# Patient Record
Sex: FEMALE | Race: BLACK OR AFRICAN AMERICAN | Employment: FULL TIME | URBAN - METROPOLITAN AREA
[De-identification: names, ages, dates, MRNs, and addresses within clinical notes are randomized per-mention and may not be internally consistent; named-entity substitution may affect disease eponyms.]

---

## 2018-11-08 ENCOUNTER — TRANSFERRED RECORDS (OUTPATIENT)
Dept: HEALTH INFORMATION MANAGEMENT | Facility: CLINIC | Age: 58
End: 2018-11-08

## 2018-11-16 ENCOUNTER — TRANSFERRED RECORDS (OUTPATIENT)
Dept: HEALTH INFORMATION MANAGEMENT | Facility: CLINIC | Age: 58
End: 2018-11-16

## 2018-12-19 ENCOUNTER — TELEPHONE (OUTPATIENT)
Dept: ONCOLOGY | Facility: CLINIC | Age: 58
End: 2018-12-19

## 2018-12-19 NOTE — TELEPHONE ENCOUNTER
ONCOLOGY INTAKE: Records Information      APPT INFORMATION:  Referring provider:  Dr Tawnya Whitfield  Referring provider s clinic:  [facility] [place]  Reason for visit/diagnosis:  hx endometrail cancer, vaginal bleeding,     Were the records received with the referral (via [employer])? Faxed from [facility] [place]    Has patient been seen for any external appt for this diagnosis (enter clinic/location)? no

## 2018-12-21 ENCOUNTER — TELEPHONE (OUTPATIENT)
Dept: CALL CENTER | Age: 58
End: 2018-12-21

## 2019-01-18 ENCOUNTER — MEDICAL CORRESPONDENCE (OUTPATIENT)
Dept: HEALTH INFORMATION MANAGEMENT | Facility: CLINIC | Age: 59
End: 2019-01-18

## 2019-01-23 DIAGNOSIS — C54.1 ENDOMETRIAL CANCER (H): Primary | ICD-10-CM

## 2019-01-24 ENCOUNTER — TELEPHONE (OUTPATIENT)
Dept: ONCOLOGY | Facility: CLINIC | Age: 59
End: 2019-01-24

## 2019-01-24 ENCOUNTER — PRE VISIT (OUTPATIENT)
Dept: RADIATION ONCOLOGY | Facility: CLINIC | Age: 59
End: 2019-01-24

## 2019-01-24 NOTE — TELEPHONE ENCOUNTER
Date of appointment: 1/29/19   Diagnosis/reason for appointment:Endometrail Cancer  Referring provider/facility: Dr. Fraga  Who called: Pool message    Recent Studies  Imaging: PET 12/29/18-[facility] [place]                  ULT Pelvic 11/16/18-[facility]                  CT Pelvis- 11/8/18-[facility]  Requested 1/24/19    Pathology: Margo Path 12/20/18-[facility]  Requested 1/24/19    Previous radiation (if known):No    Records requested from:  Records received from:    Additional information:MRI being scheduled by Dr. Fraga's office-to be done prior to Rad Onc Consult

## 2019-01-25 ENCOUNTER — APPOINTMENT (OUTPATIENT)
Dept: LAB | Facility: CLINIC | Age: 59
End: 2019-01-25
Attending: RADIOLOGY
Payer: COMMERCIAL

## 2019-01-25 ENCOUNTER — ANCILLARY PROCEDURE (OUTPATIENT)
Dept: MRI IMAGING | Facility: CLINIC | Age: 59
End: 2019-01-25
Payer: COMMERCIAL

## 2019-01-25 DIAGNOSIS — C54.1 ENDOMETRIAL CANCER (H): ICD-10-CM

## 2019-01-25 PROCEDURE — 00000346 ZZHCL STATISTIC REVIEW OUTSIDE SLIDES TC 88321: Performed by: RADIOLOGY

## 2019-01-25 RX ORDER — GADOBUTROL 604.72 MG/ML
15 INJECTION INTRAVENOUS ONCE
Status: COMPLETED | OUTPATIENT
Start: 2019-01-25 | End: 2019-01-25

## 2019-01-25 RX ADMIN — GADOBUTROL 13 ML: 604.72 INJECTION INTRAVENOUS at 12:05

## 2019-01-25 NOTE — TELEPHONE ENCOUNTER
Patient was contacted to schedule needed MRI. Patient was resistant as she would like to do this the same day as her Rad/Onc appointment. This was not advised as MRI's take some time to read and we need to assure this is final before her appointment.      will reach out to patient again and attempt to schedule MRI prior to visit.     Graciela Eller RN

## 2019-01-25 NOTE — DISCHARGE INSTRUCTIONS
MRI Contrast Discharge Instructions    The IV contrast you received today will pass out of your body in your  urine. This will happen in the next 24 hours. You will not feel this process.  Your urine will not change color.    Drink at least 4 extra glasses of water or juice today (unless your doctor  has restricted your fluids). This reduces the stress on your kidneys.  You may take your regular medicines.    If you are on dialysis: It is best to have dialysis today.    If you have a reaction: Most reactions happen right away. If you have  any new symptoms after leaving the hospital (such as hives or swelling),  call your hospital at the correct number below. Or call your family doctor.  If you have breathing distress or wheezing, call 911.    Special instructions: ***    I have read and understand the above information.    Signature:______________________________________ Date:___________    Staff:__________________________________________ Date:___________     Time:__________    [facility] Departments:    ___[place]: [phone]  ___[facility]: [phone]  ___[place]: [phone] ___[facility]: [phone]  ___[facility]: [phone]  ___[facility]: [phone]  ___[phone]  ___[facility]: [phone]  ___[phone]

## 2019-01-28 LAB — COPATH REPORT: NORMAL

## 2019-01-30 ENCOUNTER — ALLIED HEALTH/NURSE VISIT (OUTPATIENT)
Dept: RADIATION ONCOLOGY | Facility: CLINIC | Age: 59
End: 2019-01-30
Attending: RADIOLOGY
Payer: COMMERCIAL

## 2019-01-30 VITALS
WEIGHT: 293 LBS | SYSTOLIC BLOOD PRESSURE: 127 MMHG | BODY MASS INDEX: 54.5 KG/M2 | DIASTOLIC BLOOD PRESSURE: 56 MMHG | HEART RATE: 79 BPM

## 2019-01-30 DIAGNOSIS — R52 PAIN: Primary | ICD-10-CM

## 2019-01-30 DIAGNOSIS — C54.1 ENDOMETRIAL CANCER (H): ICD-10-CM

## 2019-01-30 PROCEDURE — G0463 HOSPITAL OUTPT CLINIC VISIT: HCPCS | Performed by: RADIOLOGY

## 2019-01-30 PROCEDURE — 77334 RADIATION TREATMENT AID(S): CPT | Performed by: RADIOLOGY

## 2019-01-30 RX ORDER — ALBUTEROL SULFATE 0.83 MG/ML
3 SOLUTION RESPIRATORY (INHALATION) PRN
COMMUNITY

## 2019-01-30 RX ORDER — ATORVASTATIN CALCIUM 40 MG/1
40 TABLET, FILM COATED ORAL AT BEDTIME
COMMUNITY
Start: 2019-01-11

## 2019-01-30 RX ORDER — INSULIN LISPRO 100 [IU]/ML
90 INJECTION, SUSPENSION SUBCUTANEOUS 2 TIMES DAILY
COMMUNITY
Start: 2018-03-02

## 2019-01-30 RX ORDER — ALBUTEROL SULFATE 90 UG/1
1-2 AEROSOL, METERED RESPIRATORY (INHALATION) PRN
COMMUNITY
Start: 2017-06-09

## 2019-01-30 SDOH — HEALTH STABILITY: MENTAL HEALTH: HOW OFTEN DO YOU HAVE A DRINK CONTAINING ALCOHOL?: NEVER

## 2019-01-30 ASSESSMENT — ENCOUNTER SYMPTOMS
CHILLS: 0
DYSURIA: 0
BLURRED VISION: 0
COUGH: 0
MYALGIAS: 0
HEARTBURN: 0
FEVER: 0
HEADACHES: 0
BACK PAIN: 0
DEPRESSION: 1
NAUSEA: 0
NECK PAIN: 0
TINGLING: 0
SENSORY CHANGE: 1
DIZZINESS: 0

## 2019-01-30 NOTE — PROGRESS NOTES
Radiation Therapy Patient Education    Person involved with teaching: Patient,  and Granddaughter    Patient educational needs for self management of treatment-related side effects assessment completed.  EPIC Patient Ed tab contains Patient Learning Assessment    Education Materials Given  Radiation Therapy and You and Radiation Therapy for GYN Patients    Educational Topics Discussed  Side effects expected, Pain management, Skin care, Nutrition and weight loss and When to call MD/RN    Response To Teaching  Verbalizes understanding    GYN Only  Vaginal Dilator-given and educated: not given as she is still bleeding. Will give during treatment.    Referrals sent: None    Chemotherapy?  No

## 2019-01-30 NOTE — PROGRESS NOTES
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 No    Onset of menarche: about age 13  LMP: No LMP recorded.  Onset of menopause: 2013  Abnormal vaginal bleeding/discharge: Yes soaking a pad in a day, dark color  Are you pregnant? No  Reproductive note:     Nurse face-to-face time: Level 4:  15 min face to face time    Review of Systems   Constitutional: Negative for chills and fever.   HENT: Negative for hearing loss.    Eyes: Negative for blurred vision.   Respiratory: Negative for cough.    Cardiovascular: Negative for chest pain.   Gastrointestinal: Negative for heartburn and nausea.   Genitourinary: Negative for dysuria and urgency.   Musculoskeletal: Negative for back pain, myalgias and neck pain.   Neurological: Positive for sensory change (neuropathy hands and feet). Negative for dizziness, tingling and headaches.   Psychiatric/Behavioral: Positive for depression (for about 5 years).

## 2019-01-30 NOTE — PROGRESS NOTES
RADIATION ONCOLOGY CONSULTATION  DATE:  January 30, 2019    PATIENT NAME: Lilli Locke  MEDICAL RECORD NUMBER: [id number]  REFERRING PHYSICIAN:  Dr. Fraga    REASON FOR CONSULTATION: Consideration of salvage radiotherapy for treatment of isolated vaginal recurrence of endometrial carcinoma.      HISTORY OF PRESENT ILLNESS: Ms. Lilli Locke is a 58 year old woman with a history of endometrial carcinoma diagnosed in 2013. She underwent LELE/BSO, bilateral lymph node dissection and omentectomy by Dr. Fraga on 8/29/2013. Pathology showed FIGO Grade 1 endometrial endometrioid adenocarcinoma. The tumor size was 6.5 cm, with <50% myometrial invasion (0.5/3.2cm), no LVSI, negative peritoneal cytology, negative surgical margins, and negative dissected lymph nodes. The omentum and adnexa were negative for malignancy. Adjuvant therapy was not recommended due to overall low risk.  She was followed since then without issues. She was last seen by Dr. Fraga in 2016 with no evidence of recurrent disease.     In 11/2017, she saw Dr. Taveras for intermittent vaginal bleeding with pelvic pain and constipation. Her pelvic exam revealed intact vaginal cuff with no suspicious lesions. She underwent Pap smear which was negative for malignnacy. She presented to her PCP on 11/5/2018 with persistent symptoms as well as development of some blood in the stools. Her bimanual exam at that time revealed some firmness at the cuff and rectal exam was notable for a mass anterior to the rectum. She had a PAP smear that showed atypical glandular cells suspicious for malignancy. She underwent a pelvic CT which demonstrated no suspicious lesions in the vaginal cuff or pelvic lymphadenopathy.  She also had a pelvic ultrasound that was unremarkable.     She was then referred to Dr. Whitfield at [facility] on 12/17/18. Her pelvic exam showed irregular friable lesion at the vaginal cuff measuring 3 x 2 cm. A punch biopsy was obtained and showed  ([id number]) invasive adenocarcinoma,consistent with recurrent endometrial carcinoma.The pathology was reviewed confirmed at [facility] ([id number]). She was further evaluated with a PET CT on 18 which showed irregular soft tissue thickening of the vaginal cuff (SUV max 20.4), consistent with known recurrent endometrial cancer. There was no fat plane seen at the anterior wall of the rectum and posterior wall of the urinary bladder concerning for invasion. There was no evidence of lymphadenopathy or metastatic disease.     She then had an MRI of pelvis on 19 which demonstrated 2.1 x 3.6 x 1.8 cm enhancing soft tissue mass in the right vaginal cuff with posterior extension into the anterior wall of the rectum. No evidence of bladder invasion, pelvic lymphadenopathy or metastatic disease.    She is scheduled to see Dr. Fraga at [facility] on 19 and referred to us for consideration of radiotherapy.Today, she reports ongoing bleeding over one year that has progressed in the last 3 months. It was mostly brownish dark in color, and varies from spotting to more moderate bleeding requiring several pads a day. Typically she uses 1-2 pads per day. She reported associated constant belt- like pelvic pain, 8/10 in intensity for which she used to use a mixture of analgesics including Ibuprofen,Tylenol, advil and Aleve, although she was advised to stop taking all these medications with the exception of tylenol. She also reports having ongoing constipation for 3 months. She uses different stool softeners like Senna, Miralax and Magnesium Citrate liquid with poor relief. Otherwise, she denies fever, chills, weight or appetite changes. Her KPS is 90%     PMH:   Obstructive sleep apnea   Carpal tunnel syndrome   Diabetes mellitus  Hyperlipidemia   GERD    HTN    Morbid obesity    Substance abuse     PSH:    section  Tubal ligation      MEDICATIONS:  Gabapentin   Metformin   Sennosides   Atorvastatin     Rivaroxaban   Cetirizine    Omeprazole   Fluoxetine   Insulin   Fluticasone   Albuterol   Ketotifen fumarate     ALLERGIES:  Aspirin:Abdominal Pain   Milk:diarrhea.   Lisinopril:cough   Prochlorperazine:delusions     SH:   Smoking status: Never Smoker   Smokeless tobacco: Never Used   Current Alcohol use: No, abstinent since 3/2000     FAMILY HISTORY:   Mother: ovarian cancer  Grandmother: breast cancer    HISTORY OF PREVIOUS RADIATION THERAPY:  No    HISTORY OF SYTEMIC THERAPY:  No     IMPLANTABLE CARDIAC DEVICES:  No     ROS: 10 point ROS reviewed as reported on the nursing assessment note.    PHYSICAL EXAMINATION:    Gen: Alert, in NAD  Eyes: EOMI, sclera anicteric, PERRL  HENT      Head: NC/AT     Ears: No external auricular lesions     Nose/sinus: No rhinorrhea or epistaxis     Oral Cavity/Oropharynx: MMM, no thrush noted  Pulm: Breathing comfortably on room air, no audible wheezes or ronchi  CV: Well-perfused, no cyanosis  PELVIC: Deferred as the patient has ongoing bleeding.  Neurologic/MSK: Alert and oriented x3  Psych: Appropriate mood and affect    IMAGING:      IMPRESSION: Isolated vaginal recurrence of endometrial carcinoma,s/p LELE/BSO/BLND in 2013. FIGO Grade 1 endometrial endometrioid carcinoma, <50% myometrial invasion, no LVSI, negative peritoneal cytology, negative surgical margins, negative nodes. No previous radiation therapy.  No vandana or distant metastases. Her KPS is 90%.      RECOMMENDATION: We reviewed the images with the patient and her family and showed them the location and extent of the tumor. We discussed that the intent of the treatment is curative. This would involve  Radiotherapy delivered in two phases. The first phase will be external beam radiotherapy (EBRT) to the vagina, parametrium as well as the pelvic nodes to 4500 cGy in 25 fractions. This would be followed by vaginal cuff brachytherapy boost most likely via interstitial implant to 2250 cGy in 5 fractions. After completion  of the first part, she will undergo another pelvic MRI  for assessment of treatment response before proceeding with the second part. Review of the MRI shows a loop of bowel sitting directly above the vaginal cuff tumor.  She likely will require laparoscopic guidance for needle placement.      We discussed the benefits, rationale, logistics and side effects associated with RT including fatigue, diarrhea, blood with stool, nausea, myelosuppression, dysuria, urinary frequency, vaginal stenosis and dryness, sacral insufficiency fractures, bowel obstruction and fistula. We discussed that due to the close location of the tumor to the rectal wall, the expected late rectal toxicity, such as ulceration or bleeding would be higher. She is also at risk of small bowel obstruction. The patient agreed with the plan and signed the treatment consent. We did the simulation session shortly after the visit.      In regard to her chronic pain, we will prescribe Tylenol with Codeine. She is to stop NSAIDS due to ongoing bleeding.     We spent 45 min with the patient, >50% devoted to counseling. The patient, her  and colleague have many questions during our conversation that were answered to their satisfaction and verbalized understanding.     The patient was seen and discussed with staff, Dr. Elizabeth. Thank you for involving us in the care of this patient.  Please feel free to contact us with questions or concerns at any time.    Armando Betancur MD  PGY-2 Resident, Radiation Oncology  [facility]    I saw and examined the patient with the resident.  I have reviewed and edited the resident's note and agree with the plan of care.      Elsy Elizabeth MD

## 2019-01-30 NOTE — LETTER
1/30/2019     RE: Lilli Locke  [address] [place] [address]     Dear Colleague,    Thank you for referring your patient, Lilli Locke, to the RADIATION ONCOLOGY CLINIC. Please see a copy of my visit note below.    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 No    Onset of menarche: about age 13  LMP: No LMP recorded.  Onset of menopause: 2013  Abnormal vaginal bleeding/discharge: Yes soaking a pad in a day, dark color  Are you pregnant? No  Reproductive note:     Nurse face-to-face time: Level 4:  15 min face to face time    Review of Systems   Constitutional: Negative for chills and fever.   HENT: Negative for hearing loss.    Eyes: Negative for blurred vision.   Respiratory: Negative for cough.    Cardiovascular: Negative for chest pain.   Gastrointestinal: Negative for heartburn and nausea.   Genitourinary: Negative for dysuria and urgency.   Musculoskeletal: Negative for back pain, myalgias and neck pain.   Neurological: Positive for sensory change (neuropathy hands and feet). Negative for dizziness, tingling and headaches.   Psychiatric/Behavioral: Positive for depression (for about 5 years).                 RADIATION ONCOLOGY CONSULTATION  DATE:  January 30, 2019    PATIENT NAME: Lilli Locke  MEDICAL RECORD NUMBER: [id number]  REFERRING PHYSICIAN:  Dr. Fraga    REASON FOR CONSULTATION: Consideration of salvage radiotherapy for treatment of  isolated vaginal recurrence of endometrial carcinoma.      HISTORY OF PRESENT ILLNESS: Ms. Lilli Locke is a 58 year old woman with a history of endometrial carcinoma diagnosed in 2013. She underwent LELE/BSO, bilateral lymph node dissection and omentectomy by Dr. Fraga on 8/29/2013. Pathology showed FIGO Grade 1 endometrial endometrioid adenocarcinoma. The tumor size was 6.5 cm, with <50% myometrial invasion (0.5/3.2cm), no LVSI, negative peritoneal cytology, negative surgical margins, and negative dissected lymph nodes. The omentum and adnexa were negative for malignancy. Adjuvant therapy was not recommended due to overall low risk.  She was followed since then without issues. She was last seen by Dr. Fraga in 2016 with no evidence of recurrent disease.     In 11/2017, she saw Dr. Taveras for intermittent vaginal bleeding with pelvic pain and constipation. Her pelvic exam revealed intact vaginal cuff with no suspicious lesions. She underwent Pap smear which was negative for malignnacy. She presented to her PCP on 11/5/2018 with persistent symptoms as well as development of some blood in the stools. Her bimanual exam at that time revealed some firmness at the cuff and rectal exam was notable for a mass anterior to the rectum. She had a PAP smear that showed atypical glandular cells suspicious for malignancy. She underwent a pelvic CT which demonstrated no suspicious lesions in the vaginal cuff or pelvic lymphadenopathy.  She also had a pelvic ultrasound that was unremarkable.     She was then referred to Dr. Whitfield at [facility] [place] [facility] on 12/17/18. Her pelvic exam showed irregular friable lesion at the vaginal cuff measuring 3 x 2 cm. A punch biopsy was obtained and showed ([id number]) invasive adenocarcinoma,consistent with recurrent endometrial carcinoma.The pathology was reviewed confirmed at [facility] ([id number]). She was further evaluated with a PET CT on 12/29/18 which showed irregular soft tissue thickening of the  vaginal cuff (SUV max 20.4), consistent with known recurrent endometrial cancer. There was no fat plane seen at the anterior wall of the rectum and posterior wall of the urinary bladder concerning for invasion. There was no evidence of lymphadenopathy or metastatic disease.     She then had an MRI of pelvis on 19 which demonstrated 2.1 x 3.6 x 1.8 cm enhancing soft tissue mass in the right vaginal cuff with posterior extension into the anterior wall of the rectum. No evidence of bladder invasion, pelvic lymphadenopathy or metastatic disease.    She is scheduled to see Dr. Fraga at [facility] on 19 and referred to us for consideration of radiotherapy.Today, she reports ongoing bleeding over one year that has progressed in the last 3 months. It was mostly brownish dark in color, and varies from spotting to more moderate bleeding requiring several pads a day. Typically she uses 1-2 pads per day. She reported associated constant belt- like pelvic pain, 8/10 in intensity for which she used to use a mixture of analgesics including Ibuprofen,Tylenol, advil and Aleve, although she was advised to stop taking all these medications with the exception of tylenol. She also reports having ongoing constipation for 3 months. She uses different stool softeners like Senna, Miralax and Magnesium Citrate liquid with poor relief. Otherwise, she denies fever, chills, weight or appetite changes. Her KPS is 90%     PMH:   Obstructive sleep apnea   Carpal tunnel syndrome   Diabetes mellitus  Hyperlipidemia   GERD    HTN    Morbid obesity    Substance abuse     PSH:    section  Tubal ligation      MEDICATIONS:  Gabapentin   Metformin   Sennosides   Atorvastatin    Rivaroxaban   Cetirizine    Omeprazole   Fluoxetine   Insulin   Fluticasone   Albuterol   Ketotifen fumarate     ALLERGIES:  Aspirin:Abdominal Pain   Milk:diarrhea.   Lisinopril:cough   Prochlorperazine:delusions     SH:   Smoking status: Never Smoker    Smokeless tobacco: Never Used   Current Alcohol use: No, abstinent since 3/2000     FAMILY HISTORY:   Mother: ovarian cancer  Grandmother: breast cancer    HISTORY OF PREVIOUS RADIATION THERAPY:  No    HISTORY OF SYTEMIC THERAPY:  No     IMPLANTABLE CARDIAC DEVICES:  No     ROS: 10 point ROS reviewed as reported on the nursing assessment note.    PHYSICAL EXAMINATION:    Gen: Alert, in NAD  Eyes: EOMI, sclera anicteric, PERRL  HENT      Head: NC/AT     Ears: No external auricular lesions     Nose/sinus: No rhinorrhea or epistaxis     Oral Cavity/Oropharynx: MMM, no thrush noted  Pulm: Breathing comfortably on room air, no audible wheezes or ronchi  CV: Well-perfused, no cyanosis  PELVIC: Deferred as the patient has ongoing bleeding.  Neurologic/MSK: Alert and oriented x3  Psych: Appropriate mood and affect    IMAGING:      IMPRESSION: Isolated vaginal recurrence of endometrial carcinoma,s/p LELE/BSO/BLND in 2013. FIGO Grade 1 endometrial endometrioid carcinoma, <50% myometrial invasion, no LVSI, negative peritoneal cytology, negative surgical margins, negative nodes. No previous radiation therapy.  No vandana or distant metastases. Her KPS is 90%.    RECOMMENDATION: We reviewed the images with the patient and her family and showed them the location and extent of the tumor. We discussed that the intent of the treatment is curative. This would involve  Radiotherapy delivered in two phases. The first phase will be external beam radiotherapy (EBRT) to the vagina, parametrium as well as the pelvic nodes to 4500 cGy in 25 fractions. This would be followed by vaginal cuff brachytherapy boost most likely via interstitial implant to 2250 cGy in 5 fractions. After completion of the first part, she will undergo another pelvic MRI  for assessment of treatment response before proceeding with the second part. Review of the MRI shows a loop of bowel sitting directly above the vaginal cuff tumor.  She likely will require  laparoscopic guidance for needle placement.      We discussed the benefits, rationale, logistics and side effects associated with RT including fatigue, diarrhea, blood with stool, nausea, myelosuppression, dysuria, urinary frequency, vaginal stenosis and dryness, sacral insufficiency fractures, bowel obstruction and fistula. We discussed that due to the close location of the tumor to the rectal wall, the expected late rectal toxicity, such as ulceration or bleeding would be higher. She is also at risk of small bowel obstruction. The patient agreed with the plan and signed the treatment consent. We did the simulation session shortly after the visit.      In regard to her chronic pain, we will prescribe Tylenol with Codeine. She is to stop NSAIDS due to ongoing bleeding.     We spent 45 min with the patient, >50% devoted to counseling. The patient, her  and colleague have many questions during our conversation that were answered to their satisfaction and verbalized understanding.     The patient was seen and discussed with staff, Dr. Elizabeth. Thank you for involving us in the care of this patient.  Please feel free to contact us with questions or concerns at any time.    Armando Betancur MD  PGY-2 Resident, Radiation Oncology  [facility]    I saw and examined the patient with the resident.  I have reviewed and edited the resident's note and agree with the plan of care.      Elsy Elizabeth MD

## 2019-02-10 NOTE — TELEPHONE ENCOUNTER
RECORDS STATUS - ALL OTHER DIAGNOSIS      RECORDS RECEIVED FROM: [facility]   DATE RECEIVED: See pre-visit on 1/24/19   NOTES STATUS DETAILS   OFFICE NOTE from referring provider     OFFICE NOTE from medical oncologist     DISCHARGE SUMMARY from hospital     DISCHARGE REPORT from the ER     OPERATIVE REPORT     MEDICATION LIST     CLINICAL TRIAL TREATMENTS TO DATE     LABS     PATHOLOGY REPORTS     ANYTHING RELATED TO DIAGNOSIS     GENONOMIC TESTING     TYPE:     IMAGING (NEED IMAGES & REPORT)     CT SCANS     MRI     MAMMO     ULTRASOUND     PET

## 2019-02-11 ENCOUNTER — APPOINTMENT (OUTPATIENT)
Dept: RADIATION ONCOLOGY | Facility: CLINIC | Age: 59
End: 2019-02-11
Attending: RADIOLOGY
Payer: COMMERCIAL

## 2019-02-11 VITALS
DIASTOLIC BLOOD PRESSURE: 62 MMHG | SYSTOLIC BLOOD PRESSURE: 126 MMHG | HEART RATE: 82 BPM | WEIGHT: 293 LBS | BODY MASS INDEX: 54.47 KG/M2

## 2019-02-11 DIAGNOSIS — R52 PAIN: Primary | ICD-10-CM

## 2019-02-11 PROCEDURE — 77386 ZZH IMRT TREATMENT DELIVERY, COMPLEX: CPT | Performed by: RADIOLOGY

## 2019-02-11 RX ORDER — OXYCODONE AND ACETAMINOPHEN 5; 325 MG/1; MG/1
1 TABLET ORAL EVERY 6 HOURS PRN
Qty: 30 TABLET | Refills: 0 | Status: ON HOLD | OUTPATIENT
Start: 2019-02-11 | End: 2019-03-21

## 2019-02-11 NOTE — PROGRESS NOTES
[facility] PHYSICIANS  SPECIALIZING IN BREAKTHROUGHS  Radiation Oncology    On Treatment Visit Note    Lilli Locke      Date: 2019   MRN: [id number]   : 1960  Diagnosis: Endometrial Cancer    Reason for Visit:  On Radiation Treatment Visit     Treatment Summary to Date  Treatment Site: Pelvis Current Dose: 180/4500 +  cGy Fractions:        Chemotherapy  Chemo concurrent with radx?: No    Subjective: Ms. Locke presents today for her weekly on treatment visit. She tolerated her first treatment well. Unfortunately, she reports that her pelvic pain has not improved with T#3.  She describes it as constant, diffuse pelvic pain, 7-8/10 in intensity. She does endorse occasional minimal vaginal discharge, but no bleeding.  In regard to her chronic constipation, she states that her primary care physician asked her to take 1 packet of MiraLax every hour; however, she is only able to do 3 packets a day due to her work schedule.  She additionally take Senokot 1 tablet a day.  She did have a bowel movement yesterday, but that was after experiencing bad cramping the day before.  Overall, she remains constipated.      Nursing ROS:   Nutrition Alteration  Diet Type: Patient's Preference    Skin  Skin Reaction: 0 - No changes     Gastrointestinal  Nausea: 0 - None     Pain Assessment  0-10 Pain Scale: 7  Pain Note: Tylenol with codeine, not effective    Objective:   /62   Pulse 82   Wt 135.1 kg (297 lb 12.8 oz)   BMI 54.47 kg/m    Gen: Appears well, in no acute distress  Skin: No erythema    Labs:  CBC RESULTS: No results for input(s): WBC, RBC, HGB, HCT, MCV, MCH, MCHC, RDW, PLT in the last 15906 hours.  ELECTROLYTES:  No results for input(s): NA, POTASSIUM, CHLORIDE, NEFTALI, CO2, BUN, CR, GLC in the last 99295 hours.    Assessment:    Tolerating radiation therapy well.  All questions and concerns addressed.    Toxicities: None     Plan:   1. Continue current therapy.     2. Prescribed Percocet for better pain control.  We did discuss that Percocet can make her constipation worse.    3. MiraLax one packet with 8 oz of water each day.  She will increase the dose of Senokot to 2 tablets tonight.  If no relief, she will increase Senokot to 2 tabs twice daily.      Mosaiq chart and setup information reviewedmi  Ports checked    Medication Review  Med list reviewed with patient?: Yes    Educational Topic Discussed  Education Instructions: Reviewed    Armando Betancur MD  PGY-2 Resident, Radiation Oncology  [facility]      I saw and examined the patient with the resident.  I have reviewed and edited the resident's note and agree with the plan of care.      Elsy Elizabeth MD

## 2019-02-11 NOTE — LETTER
Date:February 13, 2019      Provider requested that no letter be sent. Do not send.       [facility] Health Information

## 2019-02-11 NOTE — LETTER
2019       RE: Lilli Locke  [address] [place] [address]     Dear Colleague,    Thank you for referring your patient, Lilli Locke, to the RADIATION ONCOLOGY CLINIC. Please see a copy of my visit note below.    [facility] PHYSICIANS  SPECIALIZING IN BREAKTHROUGHS  Radiation Oncology    On Treatment Visit Note    Lilli Locke      Date: 2019   MRN: [id number]   : 1960  Diagnosis: Endometrial Cancer    Reason for Visit:  On Radiation Treatment Visit     Treatment Summary to Date  Treatment Site: Pelvis Current Dose: 180/4500 +  cGy Fractions:        Chemotherapy  Chemo concurrent with radx?: No    Subjective: Ms. Locke presents today for her weekly on treatment visit. She tolerated her first treatment well. Unfortunately, she reports that her pelvic pain has not improved with T#3.  She describes it as constant, diffuse pelvic pain, 7-8/10 in intensity. She does endorse occasional minimal vaginal discharge, but no bleeding.  In regard to her chronic constipation, she states that her primary care physician asked her to take 1 packet of MiraLax every hour; however, she is only able to do 3 packets a day due to her work schedule.  She additionally take Senokot 1 tablet a day.  She did have a bowel movement yesterday, but that was after experiencing bad cramping the day before.  Overall, she remains constipated.      Nursing ROS:   Nutrition Alteration  Diet Type: Patient's Preference    Skin  Skin Reaction: 0 - No changes     Gastrointestinal  Nausea: 0 - None     Pain Assessment  0-10 Pain Scale: 7  Pain Note: Tylenol with codeine, not effective    Objective:   /62   Pulse 82   Wt 135.1 kg (297 lb 12.8 oz)   BMI 54.47 kg/m     Gen: Appears well, in no acute distress  Skin: No erythema    Labs:  CBC RESULTS: No results for input(s): WBC, RBC, HGB, HCT, MCV, MCH, MCHC, RDW, PLT in the last 99577 hours.  ELECTROLYTES:  No results for  input(s): NA, POTASSIUM, CHLORIDE, NEFTALI, CO2, BUN, CR, GLC in the last 62310 hours.    Assessment:    Tolerating radiation therapy well.  All questions and concerns addressed.    Toxicities: None     Plan:   1. Continue current therapy.    2. Prescribed Percocet for better pain control.  We did discuss that Percocet can make her constipation worse.    3. MiraLax one packet with 8 oz of water each day.  She will increase the dose of Senokot to 2 tablets tonight.  If no relief, she will increase Senokot to 2 tabs twice daily.      Mosaiq chart and setup information reviewedmi  Ports checked    Medication Review  Med list reviewed with patient?: Yes    Educational Topic Discussed  Education Instructions: Reviewed    Armando Betancur MD  PGY-2 Resident, Radiation Oncology  [facility]      I saw and examined the patient with the resident.  I have reviewed and edited the resident's note and agree with the plan of care.      Elsy Elizabeth MD    Again, thank you for allowing me to participate in the care of your patient.      Sincerely,    Elsy Elizabeth MD

## 2019-02-12 ENCOUNTER — ONCOLOGY VISIT (OUTPATIENT)
Dept: ONCOLOGY | Facility: CLINIC | Age: 59
End: 2019-02-12
Attending: OBSTETRICS & GYNECOLOGY
Payer: COMMERCIAL

## 2019-02-12 ENCOUNTER — PRE VISIT (OUTPATIENT)
Dept: ONCOLOGY | Facility: CLINIC | Age: 59
End: 2019-02-12

## 2019-02-12 ENCOUNTER — APPOINTMENT (OUTPATIENT)
Dept: RADIATION ONCOLOGY | Facility: CLINIC | Age: 59
End: 2019-02-12
Attending: RADIOLOGY
Payer: COMMERCIAL

## 2019-02-12 VITALS
SYSTOLIC BLOOD PRESSURE: 126 MMHG | DIASTOLIC BLOOD PRESSURE: 68 MMHG | WEIGHT: 293 LBS | TEMPERATURE: 97.2 F | OXYGEN SATURATION: 96 % | HEART RATE: 85 BPM | BODY MASS INDEX: 53.77 KG/M2

## 2019-02-12 DIAGNOSIS — C54.1 ENDOMETRIAL CANCER (H): Primary | ICD-10-CM

## 2019-02-12 PROCEDURE — 77386 ZZH IMRT TREATMENT DELIVERY, COMPLEX: CPT | Performed by: RADIOLOGY

## 2019-02-12 PROCEDURE — G0463 HOSPITAL OUTPT CLINIC VISIT: HCPCS

## 2019-02-12 PROCEDURE — 99215 OFFICE O/P EST HI 40 MIN: CPT | Mod: ZP | Performed by: OBSTETRICS & GYNECOLOGY

## 2019-02-12 ASSESSMENT — PAIN SCALES - GENERAL: PAINLEVEL: MODERATE PAIN (5)

## 2019-02-12 NOTE — NURSING NOTE
"Oncology Rooming Note    February 12, 2019 11:40 AM   Lilli FROST Cornelia is a 58 year old female who presents for:    Chief Complaint   Patient presents with     Oncology Clinic Visit     Endometrial CA; Active Tx, spotting     Initial Vitals: /68   Pulse 85   Temp 97.2  F (36.2  C) (Oral)   Wt 133.4 kg (294 lb)   SpO2 96%   BMI 53.77 kg/m   Estimated body mass index is 53.77 kg/m  as calculated from the following:    Height as of 10/26/15: 1.575 m (5' 2\").    Weight as of this encounter: 133.4 kg (294 lb). Body surface area is 2.42 meters squared.  Moderate Pain (5) Comment: Data Unavailable   No LMP recorded. Patient has had a hysterectomy.  Allergies reviewed: Yes  Medications reviewed: Yes    Medications: Medication refills not needed today.  Pharmacy name entered into [employer]: [id number] - [place], [place] - [address]              "

## 2019-02-12 NOTE — LETTER
2019     RE: Lilli Locke  [address] [place] [address]     Dear Colleague,    Thank you for referring your patient, Lilli Locke, to the [facility] CANCER [facility]IN[facility]. Please see a copy of my visit note below.                            Consult Notes on Referred Patient    Date: 2019       Dr. Tawnya Whitfield  [facility]  [address]  [place] [address]       RE: Lilli Locke  : 1960  DAVID: 2019    Dear Dr. Tawnya Whitfield:    I had the pleasure of seeing your patient Lilli Locke here at the Gynecologic Cancer Clinic at the [facility] on 2019.  As you know she is a very pleasant 58 year old woman with a recent diagnosis of centrally recurrent endometrial cancer.  Given these findings she was subsequently sent to the Gynecologic Cancer Clinic for new patient consultation.     Her history is as follows:    She is status post diagnostic laparoscopy converted to exploratory laparotomy, LELE, BSO, BPLND, omentectomy, cysto on 2013. Final pathology showed a FIGO Grade 1 endometrioid SUE, 6.5cm greatest dimension, <50% myometrial invasion (0.5/3.2cm), no LVI, negative cytology, negative margins, negative nodes. Omentum, adnexa negative.      Patient's postoperative course was complicated by readmission with PE (prior history of PE, discharged on home rivaroxaban) and nausea and vomiting.      The patient had limited follow-up. Was seen in Gyn Onc clinic at [facility] in 2014 and most recently, in 2016, with recs for six month follow-up.    The patient sought care in 2017 for vaginal bleeding and was seen by a local Ob/Gyn. Exam was negative. However, since that time, she reportedly has been bleeding regularly. She had a CT  which was negative, US  also negative. She had a pap smear by her PCP showing AGC worrisome for malignancy. She was then seen by another Ob/Gyn at [facility] on 18. Exam  notable for lesion at vaginal cuff which was biopsied. Path showed invasive SUE consistent with endometrial origin. PET on 18 showed soft tissue thickening at vaginal cuff concerning for recurrence with loss of fat plane at anterior wall rectum and posterior wall bladder. No metastatic disease.     2019:  Seen by me at [facility].  Exam notable for large mass at top of cuff.  Discussed referral to Rad Onc at [facility] for radiation therapy and MRI.    19:  MRI pelvis:  2.1 x 3.6 x 1.8 cm enhancing soft tissue mass in the right vaginal cuff with posterior extension into the anterior wall of the rectum. No evidence of bladder invasion, pelvic lymphadenopathy or metastatic disease.    19:  Seen by Rad Onc with plans for EBRT, vaginal cuff brachytherapy via interstitial implant    The patient presents today for evaluation. She started radiation yesterday.  Some diarrhea yesterday also which has now resolved.  Pain controlled with Percocet. Ongoing intermittent vaginal bleeding.  No other complaints.       Past Medical History:    Past Medical History:   Diagnosis Date     Endometrial cancer (H)      Hx of pulmonary embolus      Mixed hyperlipidemia      Obstructive sleep apnea     has CPAP     Type II diabetes mellitus (H)          Past Surgical History:    Past Surgical History:   Procedure Laterality Date      SECTION      x2     TUBAL LIGATION           Health Maintenance:  Health Maintenance Due   Topic Date Due     PHQ-2 Q1 YR  1972     HIV SCREEN (SYSTEM ASSIGNED)  1978     HEPATITIS C SCREENING  1978     PAP SCREENING Q3 YR (SYSTEM ASSIGNED)  1981     DTAP/TDAP/TD IMMUNIZATION (1 - Tdap) 1985     MAMMO SCREEN Q2 YR (SYSTEM ASSIGNED)  2000     LIPID SCREEN Q5 YR FEMALE (SYSTEM ASSIGNED)  2005     COLON CANCER SCREEN (SYSTEM ASSIGNED)  2010     ZOSTER IMMUNIZATION (1 of 2) 2010     ADVANCE DIRECTIVE PLANNING Q5 YRS  2015     INFLUENZA  VACCINE (1) 09/01/2018       Current Medications:     has a current medication list which includes the following prescription(s): albuterol, albuterol, atorvastatin, cetirizine, fluoxetine, fluticasone-salmeterol, gabapentin, insulin lispro prot & lispro, lisinopril, metformin hcl, omeprazole, and oxycodone-acetaminophen.       Allergies:     [unfilled]        Social History:     Social History     Tobacco Use     Smoking status: Never Smoker     Smokeless tobacco: Never Used   Substance Use Topics     Alcohol use: No     Alcohol/week: 0.0 oz     Frequency: Never       History   Drug Use Not on file           Family History:     The patient's family history is notable for:    Family History   Problem Relation Age of Onset     Cancer Mother         ovarian     Cancer Maternal Grandmother         Breast          Physical Exam:     /68   Pulse 85   Temp 97.2  F (36.2  C) (Oral)   Wt 133.4 kg (294 lb)   SpO2 96%   BMI 53.77 kg/m     Body mass index is 53.77 kg/m .    General Appearance: healthy and alert, no distress     Musculoskeletal: extremities non tender and without edema    Skin: no lesions or rashes     Neurological: normal gait, no gross defects     Psychiatric: appropriate mood and affect                               Hematological: normal cervical, supraclavicular and inguinal lymph nodes     Gastrointestinal:       abdomen soft, non-tender, non-distended, no organomegaly or masses      Assessment:    Lilli is a 58 y.o. old woman with a vaginal cuff/pelvic recurrence of her endometrial cancer    A total of 45 minutes was spent with the patient, 45 minutes of which were spent in counseling the patient and/or treatment planning.    Plan:     1.) Vaginal cuff/pelvic recurrence of her endometrial cancer: Reviewed exam findings, PET, MRI and path report in detail with again with  patient. Unfortunately, she has a large mass at top of vagina which has likely been there for a while based on symptoms  and size. Fortunately, no signs of metastatic disease. Plan radiation as per Radiation Oncology care.    Discussed interstitial needles, potential need for surgery. Questions answered.    Continue prn percocet.    Follow-up after radiation.    Crissy Fraga MD  Gynecologic Oncology  [facility]    CC  Patient Care Team:  No Ref-Primary, Physician as PCP - General  CRIS HERNANDEZ

## 2019-02-13 ENCOUNTER — APPOINTMENT (OUTPATIENT)
Dept: RADIATION ONCOLOGY | Facility: CLINIC | Age: 59
End: 2019-02-13
Attending: RADIOLOGY
Payer: COMMERCIAL

## 2019-02-13 PROCEDURE — 77386 ZZH IMRT TREATMENT DELIVERY, COMPLEX: CPT | Performed by: RADIOLOGY

## 2019-02-14 ENCOUNTER — APPOINTMENT (OUTPATIENT)
Dept: RADIATION ONCOLOGY | Facility: CLINIC | Age: 59
End: 2019-02-14
Attending: RADIOLOGY
Payer: COMMERCIAL

## 2019-02-14 PROCEDURE — 77386 ZZH IMRT TREATMENT DELIVERY, COMPLEX: CPT | Performed by: RADIOLOGY

## 2019-02-15 ENCOUNTER — APPOINTMENT (OUTPATIENT)
Dept: RADIATION ONCOLOGY | Facility: CLINIC | Age: 59
End: 2019-02-15
Attending: RADIOLOGY
Payer: COMMERCIAL

## 2019-02-15 PROCEDURE — 77336 RADIATION PHYSICS CONSULT: CPT | Performed by: RADIOLOGY

## 2019-02-15 PROCEDURE — 77386 ZZH IMRT TREATMENT DELIVERY, COMPLEX: CPT | Performed by: RADIOLOGY

## 2019-02-18 ENCOUNTER — APPOINTMENT (OUTPATIENT)
Dept: RADIATION ONCOLOGY | Facility: CLINIC | Age: 59
End: 2019-02-18
Attending: RADIOLOGY
Payer: COMMERCIAL

## 2019-02-18 VITALS
SYSTOLIC BLOOD PRESSURE: 112 MMHG | HEART RATE: 76 BPM | DIASTOLIC BLOOD PRESSURE: 54 MMHG | OXYGEN SATURATION: 94 % | WEIGHT: 293 LBS | BODY MASS INDEX: 53.72 KG/M2

## 2019-02-18 DIAGNOSIS — R11.0 NAUSEA: Primary | ICD-10-CM

## 2019-02-18 DIAGNOSIS — C54.1 ENDOMETRIAL CANCER (H): ICD-10-CM

## 2019-02-18 PROCEDURE — 77386 ZZH IMRT TREATMENT DELIVERY, COMPLEX: CPT | Performed by: RADIOLOGY

## 2019-02-18 RX ORDER — DOCUSATE SODIUM 100 MG/1
100 CAPSULE, LIQUID FILLED ORAL PRN
COMMUNITY

## 2019-02-18 RX ORDER — ONDANSETRON 8 MG/1
8 TABLET, FILM COATED ORAL EVERY 8 HOURS PRN
Qty: 30 TABLET | Refills: 0 | Status: ON HOLD | OUTPATIENT
Start: 2019-02-18 | End: 2019-03-21

## 2019-02-18 RX ORDER — POLYETHYLENE GLYCOL 3350 17 G/17G
1 POWDER, FOR SOLUTION ORAL DAILY
COMMUNITY
End: 2019-03-07

## 2019-02-18 RX ORDER — PHENAZOPYRIDINE HYDROCHLORIDE 200 MG/1
200 TABLET, FILM COATED ORAL ONCE
Status: CANCELLED | OUTPATIENT
Start: 2019-02-18 | End: 2019-02-18

## 2019-02-18 NOTE — PROGRESS NOTES
"[facility] PHYSICIANS  SPECIALIZING IN BREAKTHROUGHS  Radiation Oncology    On Treatment Visit Note    Lilli Locke      Date: 2019   MRN: [id number]   : 1960  Diagnosis: Endometrial Cancer    Reason for Visit:  On Radiation Treatment Visit     Treatment Summary to Date  Treatment Site: Pelvis Current Dose: 1080/4500 + 5 cGy Fractions:        Chemotherapy  Chemo concurrent with radx?: No    Subjective: Ms. Locke presents today for her weekly on treatment visit. She reports increased fatigue. She had to leave her work twice in the last week to go for sleep. She reports that her pelvic pain has markedly improved since the last week. She takes one pill of Percocet at night and rarely needed tylenol. She has a disturbed bowel habits. She is mostly constipated; however, she had multiple episodes of diarrhea last Saturday.  She then had a \"good bowel movement\" this morning, which is formed. She takes Miralax one packet and Senokot one pill per day for her chronic contipation. Her vaginal spotting has been mild.  She describes fresh blood on her pad when it does occur. She also reports ongoing nausea since she started the treatment. She has a queasy feeling most of the time without actual vomiting.      Nursing ROS:   Nutrition Alteration  Diet Type: Patient's Preference  Nutrition Note: appetite loew, r/t nausea    Skin  Skin Reaction: 0 - No changes  Skin Note: takes percocet in the evenimg PRN, about 6/week     Gastrointestinal  Nausea: 1 - One to two episodes of nausea/24  GI Note: uses colace PRN    Genitourinary   Note: WNL    Psychosocial  Pyschosocial Note: feeling tired    Pain Assessment  0-10 Pain Scale: 0  Pain Note: Tylenol with codeine, not effective      Objective:   /54   Pulse 76   Wt 133.2 kg (293 lb 11.2 oz)   SpO2 94%   BMI 53.72 kg/m    Gen: Appears well, in no acute distress      Labs:  CBC RESULTS: No results for input(s): WBC, RBC, HGB, HCT, MCV, MCH, " MCHC, RDW, PLT in the last 33647 hours.  ELECTROLYTES:  No results for input(s): NA, POTASSIUM, CHLORIDE, NEFTALI, CO2, BUN, CR, GLC in the last 32050 hours.    Assessment:  She is tolerating her radiation therapy well.  Marked improvement in her pelvic pain. She has ongoing nausea and disturbed bowel habits. All questions and concerns addressed.    Toxicities:  Nausea: Grade I- loss of appetite without alteration in eating habits.  Pain: Grade I- Mild pain      Plan:   1. Continue current therapy.   2. Prescribed Zofran 8 mg up to 3 times a day for nausea.  She is allergic to compazine.   3. Continue current treatment for pain and constipation  4. Encourage exercise daily      Mosaiq chart and setup information reviewed  Ports checked    Medication Review  Med list reviewed with patient?: Yes    Educational Topic Discussed  Additional Instructions: No vaginal bleeding today.  Education Instructions: Reviewed      Armando Betancur MD  PGY-2 Resident, Radiation Oncology  [facility]    I saw and examined the patient with the resident.  I have reviewed and edited the resident's note and agree with the plan of care.      Elsy Elizabeth MD

## 2019-02-18 NOTE — LETTER
"2019       RE: Lilli Lcoke  [address] [place] [address]     Dear Colleague,    Thank you for referring your patient, Lilli Locke, to the RADIATION ONCOLOGY CLINIC. Please see a copy of my visit note below.    [facility] PHYSICIANS  SPECIALIZING IN BREAKTHROUGHS  Radiation Oncology    On Treatment Visit Note    Lilli Locke      Date: 2019   MRN: [id number]   : 1960  Diagnosis: Endometrial Cancer    Reason for Visit:  On Radiation Treatment Visit     Treatment Summary to Date  Treatment Site: Pelvis Current Dose: 1080/4500 + 5 cGy Fractions:        Chemotherapy  Chemo concurrent with radx?: No    Subjective: Ms. Locke presents today for her weekly on treatment visit. She reports increased fatigue. She had to leave her work twice in the last week to go for sleep. She reports that her pelvic pain has markedly improved since the last week. She takes one pill of Percocet at night and rarely needed tylenol. She has a disturbed bowel habits. She is mostly constipated; however, she had multiple episodes of diarrhea last Saturday.  She then had a \"good bowel movement\" this morning, which is formed. She takes Miralax one packet and Senokot one pill per day for her chronic contipation. Her vaginal spotting has been mild.  She describes fresh blood on her pad when it does occur. She also reports ongoing nausea since she started the treatment. She has a queasy feeling most of the time without actual vomiting.      Nursing ROS:   Nutrition Alteration  Diet Type: Patient's Preference  Nutrition Note: appetite loew, r/t nausea    Skin  Skin Reaction: 0 - No changes  Skin Note: takes percocet in the evenimg PRN, about 6/week     Gastrointestinal  Nausea: 1 - One to two episodes of nausea/24  GI Note: uses colace PRN    Genitourinary   Note: WNL    Psychosocial  Pyschosocial Note: feeling tired    Pain Assessment  0-10 Pain Scale: 0  Pain Note: Tylenol with " codeine, not effective      Objective:   /54   Pulse 76   Wt 133.2 kg (293 lb 11.2 oz)   SpO2 94%   BMI 53.72 kg/m     Gen: Appears well, in no acute distress      Labs:  CBC RESULTS: No results for input(s): WBC, RBC, HGB, HCT, MCV, MCH, MCHC, RDW, PLT in the last 80498 hours.  ELECTROLYTES:  No results for input(s): NA, POTASSIUM, CHLORIDE, NEFTALI, CO2, BUN, CR, GLC in the last 79443 hours.    Assessment:  She is tolerating her radiation therapy well.  Marked improvement in her pelvic pain. She has ongoing nausea and disturbed bowel habits. All questions and concerns addressed.    Toxicities:  Nausea: Grade I- loss of appetite without alteration in eating habits.  Pain: Grade I- Mild pain      Plan:   1. Continue current therapy.   2. Prescribed Zofran 8 mg up to 3 times a day for nausea.  She is allergic to compazine.   3. Continue current treatment for pain and constipation  4. Encourage exercise daily      Mosaiq chart and setup information reviewed  Ports checked    Medication Review  Med list reviewed with patient?: Yes    Educational Topic Discussed  Additional Instructions: No vaginal bleeding today.  Education Instructions: Reviewed      Armando Betancur MD  PGY-2 Resident, Radiation Oncology  [facility]    I saw and examined the patient with the resident.  I have reviewed and edited the resident's note and agree with the plan of care.      Elsy Elizabeth MD        Again, thank you for allowing me to participate in the care of your patient.      Sincerely,    Elsy Elizabeth MD

## 2019-02-18 NOTE — LETTER
Date:February 19, 2019      Provider requested that no letter be sent. Do not send.       [facility] Health Information

## 2019-02-19 ENCOUNTER — APPOINTMENT (OUTPATIENT)
Dept: RADIATION ONCOLOGY | Facility: CLINIC | Age: 59
End: 2019-02-19
Attending: RADIOLOGY
Payer: COMMERCIAL

## 2019-02-19 PROCEDURE — 77386 ZZH IMRT TREATMENT DELIVERY, COMPLEX: CPT | Performed by: RADIOLOGY

## 2019-02-20 ENCOUNTER — APPOINTMENT (OUTPATIENT)
Dept: RADIATION ONCOLOGY | Facility: CLINIC | Age: 59
End: 2019-02-20
Attending: RADIOLOGY
Payer: COMMERCIAL

## 2019-02-20 PROCEDURE — 77386 ZZH IMRT TREATMENT DELIVERY, COMPLEX: CPT | Performed by: RADIOLOGY

## 2019-02-21 ENCOUNTER — APPOINTMENT (OUTPATIENT)
Dept: RADIATION ONCOLOGY | Facility: CLINIC | Age: 59
End: 2019-02-21
Attending: RADIOLOGY
Payer: COMMERCIAL

## 2019-02-21 PROCEDURE — 77386 ZZH IMRT TREATMENT DELIVERY, COMPLEX: CPT | Performed by: RADIOLOGY

## 2019-02-22 ENCOUNTER — APPOINTMENT (OUTPATIENT)
Dept: RADIATION ONCOLOGY | Facility: CLINIC | Age: 59
End: 2019-02-22
Attending: RADIOLOGY
Payer: COMMERCIAL

## 2019-02-22 PROCEDURE — 77386 ZZH IMRT TREATMENT DELIVERY, COMPLEX: CPT | Performed by: RADIOLOGY

## 2019-02-22 PROCEDURE — 77336 RADIATION PHYSICS CONSULT: CPT | Performed by: RADIOLOGY

## 2019-02-25 ENCOUNTER — APPOINTMENT (OUTPATIENT)
Dept: RADIATION ONCOLOGY | Facility: CLINIC | Age: 59
End: 2019-02-25
Attending: RADIOLOGY
Payer: COMMERCIAL

## 2019-02-25 VITALS
BODY MASS INDEX: 54.32 KG/M2 | WEIGHT: 293 LBS | DIASTOLIC BLOOD PRESSURE: 53 MMHG | SYSTOLIC BLOOD PRESSURE: 124 MMHG | HEART RATE: 64 BPM

## 2019-02-25 DIAGNOSIS — C54.1 ENDOMETRIAL CANCER (H): Primary | ICD-10-CM

## 2019-02-25 PROCEDURE — 77386 ZZH IMRT TREATMENT DELIVERY, COMPLEX: CPT | Performed by: RADIOLOGY

## 2019-02-25 NOTE — LETTER
Date:March 4, 2019      Patient was self referred, no letter generated. Do not send.        [facility] Health Information

## 2019-02-25 NOTE — PROGRESS NOTES
[facility] PHYSICIANS  SPECIALIZING IN BREAKTHROUGHS  Radiation Oncology    On Treatment Visit Note    Lilli Locke      Date: 2019   MRN: [id number]   : 1960  Diagnosis: Endometrial Cancer    Reason for Visit:  On Radiation Treatment Visit     Treatment Summary to Date  Treatment Site: Pelvis Current Dose: 1980/4500 + 5 cGy Fractions:        Chemotherapy  Chemo concurrent with radx?: No    Subjective:  Ms. Locke presents today for her weekly on treatment visit. She continues to feel tired. She still works for 5-6 hours, 5 days a week, though she can leave work and go home if she is tired.  She reports ongoing disturbed bowel movements, which is alternating constipation and diarrhea.  She states that if she has diarrhea, she then would stop taking laxatives. Her pelvic pain is well controlled by Percocet one pill at night. She has an ongoing mild nausea without vomiting.     Nursing ROS:   Nutrition Alteration  Diet Type: Patient's Preference  Nutrition Note: appetite loew, r/t nausea    Skin  Skin Reaction: 0 - No changes  Skin Note: takes percocet in the evenimg PRN, about 6/week     Gastrointestinal  Nausea: 0 - None(Occ. nausea no meds needed at this time. )  GI Note: uses colace PRN. Will get some imodium also. occ. diarrhea    Genitourinary   Note: WNL    Psychosocial  Pyschosocial Note: feeling tired    Pain Assessment  0-10 Pain Scale: 0  Pain Note: percocet. see note above      Objective:   /53   Pulse 64   Wt 134.7 kg (297 lb)   BMI 54.32 kg/m    Gen: Appears well, in no acute distress    Labs:  CBC RESULTS: No results for input(s): WBC, RBC, HGB, HCT, MCV, MCH, MCHC, RDW, PLT in the last 38228 hours.  ELECTROLYTES:  No results for input(s): NA, POTASSIUM, CHLORIDE, NEFTALI, CO2, BUN, CR, GLC in the last 83447 hours.    Assessment:  She is tolerating her radiation therapy well.  Well controoled pelvic pain. She has ongoing nausea and disturbed bowel habits. All  questions and concerns addressed.    Toxicities:  Nausea: Grade I- loss of appetite without alteration in eating habits.  Diarrhea: Grade 1  Fatigue: Grade 1    Plan:   1. Continue current therapy.   2. Nausea: has not felt the need to fill Zofran.  Will start taking Zofran if nausea becomes more persistent.  3. Diarrhea alternating with constipation: discussed getting imodium in case diarrhea gets worse.  She will continue to manage both diarrhea and constipation.  I anticipate she will mostly be dealing with diarrhea the remainder of her treatment course.   4. Went over appointments associated with her brachytherapy including MRI, PAC visit.  5. Encourage exercise daily    Mosaiq chart and setup information reviewed  Ports checked    Medication Review  Med list reviewed with patient?: Yes    Educational Topic Discussed  Additional Instructions: No vaginal bleeding today.  Education Instructions: Reviewed        Armando Betnacur MD  PGY-2 Resident, Radiation Oncology  [facility]    I saw and examined the patient with the resident.  I have reviewed and edited the resident's note and agree with the plan of care.      Elsy Elizabeth MD

## 2019-02-25 NOTE — LETTER
2019       RE: Lilli Locke  [address] [place] [address]     Dear Colleague,    Thank you for referring your patient, Lilli Locke, to the RADIATION ONCOL[facility]Y [facility]. Please see a copy of my visit note below.    [facility]  SPECIALIZING IN BREAKTHROUGHS  Radiation Oncology    On Treatment Visit Note    Lilli Locke      Date: 2019   MRN: [id number]   : 1960  Diagnosis: Endometrial Cancer    Reason for Visit:  On Radiation Treatment Visit     Treatment Summary to Date  Treatment Site: Pelvis Current Dose: / + 5 cGy Fractions:        Chemotherapy  Chemo concurrent with radx?: No    Subjective:  Ms. Locke presents today for her weekly on treatment visit. She continues to feel tired. She still works for 5-6 hours, 5 days a week, though she can leave work and go home if she is tired.  She reports ongoing disturbed bowel movements, which is alternating constipation and diarrhea.  She states that if she has diarrhea, she then would stop taking laxatives. Her pelvic pain is well controlled by Percocet one pill at night. She has an ongoing mild nausea without vomiting.     Nursing ROS:   Nutrition Alteration  Diet Type: Patient's Preference  Nutrition Note: appetite loew, r/t nausea    Skin  Skin Reaction: 0 - No changes  Skin Note: takes percocet in the evenimg PRN, about 6/week     Gastrointestinal  Nausea: 0 - None(Occ. nausea no meds needed at this time. )  GI Note: uses colace PRN. Will get some imodium also. occ. diarrhea    Genitourinary   Note: WNL    Psychosocial  Pyschosocial Note: feeling tired    Pain Assessment  0-10 Pain Scale: 0  Pain Note: percocet. see note above      Objective:   /53   Pulse 64   Wt 134.7 kg (297 lb)   BMI 54.32 kg/m     Gen: Appears well, in no acute distress    Labs:  CBC RESULTS: No results for input(s): WBC, RBC, HGB, HCT, MCV, MCH, MCHC, RDW, PLT in the last 02176  hours.  ELECTROLYTES:  No results for input(s): NA, POTASSIUM, CHLORIDE, NEFTALI, CO2, BUN, CR, GLC in the last 33870 hours.    Assessment:  She is tolerating her radiation therapy well.   Well controoled pelvic pain. She has ongoing nausea and disturbed bowel habits. All questions and concerns addressed.    Toxicities:  Nausea: Grade I- loss of appetite without alteration in eating habits.  Diarrhea: Grade 1  Fatigue: Grade 1    Plan:   1. Continue current therapy.   2. Nausea: has not felt the need to fill Zofran.  Will start taking Zofran if nausea becomes more persistent.  3. Diarrhea alternating with constipation: discussed getting imodium in case diarrhea gets worse.  She will continue to manage both diarrhea and constipation.  I anticipate she will mostly be dealing with diarrhea the remainder of her treatment course.   4. Went over appointments associated with her brachytherapy including MRI, PAC visit.  5. Encourage exercise daily    Mosaiq chart and setup information reviewed  Ports checked    Medication Review  Med list reviewed with patient?: Yes    Educational Topic Discussed  Additional Instructions: No vaginal bleeding today.  Education Instructions: Reviewed        Armando Betancur MD  PGY-2 Resident, Radiation Oncology  [facility]    I saw and examined the patient with the resident.  I have reviewed and edited the resident's note and agree with the plan of care.      Elsy Elizabeth MD    Again, thank you for allowing me to participate in the care of your patient.      Sincerely,    Elsy Elizabeth MD

## 2019-02-26 ENCOUNTER — APPOINTMENT (OUTPATIENT)
Dept: RADIATION ONCOLOGY | Facility: CLINIC | Age: 59
End: 2019-02-26
Attending: RADIOLOGY
Payer: COMMERCIAL

## 2019-02-26 PROCEDURE — 77386 ZZH IMRT TREATMENT DELIVERY, COMPLEX: CPT | Performed by: RADIOLOGY

## 2019-02-27 ENCOUNTER — APPOINTMENT (OUTPATIENT)
Dept: RADIATION ONCOLOGY | Facility: CLINIC | Age: 59
End: 2019-02-27
Attending: RADIOLOGY
Payer: COMMERCIAL

## 2019-02-27 PROCEDURE — 77386 ZZH IMRT TREATMENT DELIVERY, COMPLEX: CPT | Performed by: RADIOLOGY

## 2019-02-28 ENCOUNTER — HOSPITAL ENCOUNTER (INPATIENT)
Facility: CLINIC | Age: 59
Setting detail: SURGERY ADMIT
End: 2019-02-28
Attending: RADIOLOGY | Admitting: RADIOLOGY
Payer: COMMERCIAL

## 2019-02-28 ENCOUNTER — APPOINTMENT (OUTPATIENT)
Dept: RADIATION ONCOLOGY | Facility: CLINIC | Age: 59
End: 2019-02-28
Attending: RADIOLOGY
Payer: COMMERCIAL

## 2019-02-28 PROCEDURE — 77386 ZZH IMRT TREATMENT DELIVERY, COMPLEX: CPT | Performed by: RADIOLOGY

## 2019-03-01 ENCOUNTER — APPOINTMENT (OUTPATIENT)
Dept: RADIATION ONCOLOGY | Facility: CLINIC | Age: 59
End: 2019-03-01
Attending: RADIOLOGY
Payer: COMMERCIAL

## 2019-03-01 PROCEDURE — 77336 RADIATION PHYSICS CONSULT: CPT | Performed by: RADIOLOGY

## 2019-03-01 PROCEDURE — 77386 ZZH IMRT TREATMENT DELIVERY, COMPLEX: CPT | Performed by: RADIOLOGY

## 2019-03-04 ENCOUNTER — OFFICE VISIT (OUTPATIENT)
Dept: RADIATION ONCOLOGY | Facility: CLINIC | Age: 59
End: 2019-03-04
Attending: RADIOLOGY
Payer: COMMERCIAL

## 2019-03-04 VITALS — SYSTOLIC BLOOD PRESSURE: 120 MMHG | HEART RATE: 57 BPM | DIASTOLIC BLOOD PRESSURE: 75 MMHG

## 2019-03-04 DIAGNOSIS — C54.1 ENDOMETRIAL CANCER (H): Primary | ICD-10-CM

## 2019-03-04 DIAGNOSIS — N30.00 ACUTE CYSTITIS WITHOUT HEMATURIA: ICD-10-CM

## 2019-03-04 LAB
ALBUMIN UR-MCNC: 30 MG/DL
APPEARANCE UR: ABNORMAL
BILIRUB UR QL STRIP: NEGATIVE
COLOR UR AUTO: YELLOW
GLUCOSE UR STRIP-MCNC: NEGATIVE MG/DL
HGB UR QL STRIP: ABNORMAL
HYALINE CASTS #/AREA URNS LPF: 3 /LPF (ref 0–2)
KETONES UR STRIP-MCNC: NEGATIVE MG/DL
LEUKOCYTE ESTERASE UR QL STRIP: ABNORMAL
MUCOUS THREADS #/AREA URNS LPF: PRESENT /LPF
NITRATE UR QL: POSITIVE
PH UR STRIP: 5.5 PH (ref 5–7)
RBC #/AREA URNS AUTO: 14 /HPF (ref 0–2)
SOURCE: ABNORMAL
SP GR UR STRIP: 1.01 (ref 1–1.03)
SQUAMOUS #/AREA URNS AUTO: 4 /HPF (ref 0–1)
TRANS CELLS #/AREA URNS HPF: <1 /HPF (ref 0–1)
UROBILINOGEN UR STRIP-MCNC: NORMAL MG/DL (ref 0–2)
WBC #/AREA URNS AUTO: 142 /HPF (ref 0–5)

## 2019-03-04 PROCEDURE — 87086 URINE CULTURE/COLONY COUNT: CPT | Performed by: RADIOLOGY

## 2019-03-04 PROCEDURE — 81001 URINALYSIS AUTO W/SCOPE: CPT | Performed by: RADIOLOGY

## 2019-03-04 PROCEDURE — 77386 ZZH IMRT TREATMENT DELIVERY, COMPLEX: CPT | Performed by: RADIOLOGY

## 2019-03-04 RX ORDER — SULFAMETHOXAZOLE/TRIMETHOPRIM 800-160 MG
1 TABLET ORAL 2 TIMES DAILY
Qty: 6 TABLET | Refills: 0 | Status: SHIPPED | OUTPATIENT
Start: 2019-03-04 | End: 2019-04-23

## 2019-03-04 NOTE — LETTER
Date:March 6, 2019      Patient was self referred, no letter generated. Do not send.        [facility] Health Information

## 2019-03-04 NOTE — PROGRESS NOTES
"[facility]  SPECIALIZING IN BREAKTHROUGHS  Radiation Oncology    On Treatment Visit Note    Lilli Locke      Date: 3/4/2019   MRN: [id number]   : 1960  Diagnosis: Endometrial Cancer    Reason for Visit:  On Radiation Treatment Visit     Treatment Summary to Date  Treatment Site: Pelvis Current Dose: 2880/4500 + 5 cGy Fractions:        Chemotherapy  Chemo concurrent with radx?: No    Subjective:  Ms. Locke presents today for her weekly on treatment visits. She has multiple issues this week. She fees more tired. She decided to take some time off from her work till the end of her treatment. She reported persistent severe diarrhea in the previous week. She describes it as \"it is all the day and I spent the night at bathroom\". She took three immoidums a day. She has a burning with urination and continues to have scanty vaginal spotting. Her pelvic pain is well controlled by Percocet one pill a day.        Nursing ROS:   Nutrition Alteration  Diet Type: Patient's Preference  Nutrition Note: appetite loew, r/t nausea    Skin  Skin Reaction: 0 - No changes  Skin Note: takes percocet in the evenimg PRN, about 6/week     Gastrointestinal  Nausea: 0 - None(Occ. nausea no meds needed at this time. )  GI Note: uses colace PRN. Will get some imodium also. occ. diarrhea    Genitourinary   Note: WNL    Psychosocial  Pyschosocial Note: feeling tired    Pain Assessment  Pain Note: percocet. see note above      Objective:   There were no vitals taken for this visit.  Gen: Appears well, in no acute distress      Labs:  CBC RESULTS: No results for input(s): WBC, RBC, HGB, HCT, MCV, MCH, MCHC, RDW, PLT in the last 55803 hours.  ELECTROLYTES:  No results for input(s): NA, POTASSIUM, CHLORIDE, NEFTALI, CO2, BUN, CR, GLC in the last 20114 hours.    Assessment:  She tolerated her treatment well. She is developing expected side effects including diarrhea, vaginal spotting, fatigue and dysuria.  All " questions and concerns addressed.    Toxicities:  Diarrhea: grade II  Fatigue: grade I  Dysuria: grade I      Plan:   1. Continue current therapy.    2. Encourage hydration and diet modifications for diarrhea  3. Stop anti-constipation medications  4. Use of Imodium two pills in the morning then after each episode for up to 8 a day.  5. Continue current pain regimen  6. UA for determination of possible UTI       Mosaiq chart and setup information reviewed  Ports checked    Medication Review  Med list reviewed with patient?: Yes    Educational Topic Discussed  Additional Instructions: No vaginal bleeding today.  Education Instructions: Reviewed      Armando Betancur MD  PGY-2 Resident, Radiation Oncology  [facility]    I saw and examined the patient with the resident.  I have reviewed and edited the resident's note and agree with the plan of care.    UA positive.  Prescribed Bactrim DS.  Will follow culture and sensitivity result.      Elsy Elizabeth MD

## 2019-03-04 NOTE — LETTER
"3/4/2019       RE: Lilli Locke  [address] [place] [address]     Dear Colleague,    Thank you for referring your patient, Lilli Locke, to the RAD[facility]ATION [facility]COL[facility]Y CLINIC. Please see a copy of my visit note below.    [facility]  SPECIALIZING IN BREAKTHROUGHS  Radiation Oncology    On Treatment Visit Note    Lilli Locke      Date: 3/4/2019   MRN: [id number]   : 1960  Diagnosis: Endometrial Cancer    Reason for Visit:  On Radiation Treatment Visit     Treatment Summary to Date  Treatment Site: Pelvis Current Dose: 2880/4500 + 5 cGy Fractions:        Chemotherapy  Chemo concurrent with radx?: No    Subjective:  Ms. Locke presents today for her weekly on treatment visits. She has multiple issues this week. She fees more tired. She decided to take some time off from her work till the end of her treatment. She reported persistent severe diarrhea in the previous week. She describes it as \"it is all the day and I spent the night at bathroom\". She took three immoidums a day. She has a burning with urination and continues to have scanty vaginal spotting. Her pelvic pain is well controlled by Percocet one pill a day.        Nursing ROS:   Nutrition Alteration  Diet Type: Patient's Preference  Nutrition Note: appetite loew, r/t nausea    Skin  Skin Reaction: 0 - No changes  Skin Note: takes percocet in the evenimg PRN, about 6/week     Gastrointestinal  Nausea: 0 - None(Occ. nausea no meds needed at this time. )  GI Note: uses colace PRN. Will get some imodium also. occ. diarrhea    Genitourinary   Note: WNL    Psychosocial  Pyschosocial Note: feeling tired    Pain Assessment  Pain Note: percocet. see note above      Objective:   There were no vitals taken for this visit.  Gen: Appears well, in no acute distress      Labs:  CBC RESULTS: No results for input(s): WBC, RBC, HGB, HCT, MCV, MCH, MCHC, RDW, PLT in the last 43141 hours.  ELECTROLYTES:  No " results for input(s): NA, POTASSIUM, CHLORIDE, NEFTALI, CO2, BUN, CR, GLC in the last 85866 hours.    Assessment:  She tolerated her treatment well. She is developing expected side effects including diarrhea, vaginal spotting, fatigue and dysuria.  All questions and concerns addressed.    Toxicities:  Diarrhea: grade II  Fatigue: grade I  Dysuria: grade I      Plan:   1. Continue current therapy.    2. Encourage hydration and diet modifications for diarrhea  3. Stop anti-constipation medications  4. Use of Imodium two pills in the morning then after each episode for up to 8 a day.  5. Continue current pain regimen  6. UA for determination of possible UTI       Mosaiq chart and setup information reviewed  Ports checked    Medication Review  Med list reviewed with patient?: Yes    Educational Topic Discussed  Additional Instructions: No vaginal bleeding today.  Education Instructions: Reviewed      Armando Betancur MD  PGY-2 Resident, Radiation Oncology  [facility]    I saw and examined the patient with the resident.  I have reviewed and edited the resident's note and agree with the plan of care.    UA positive.  Prescribed Bactrim DS.  Will follow culture and sensitivity result.      Elsy Elizabeth MD    Again, thank you for allowing me to participate in the care of your patient.      Sincerely,    Elsy Elizabeth MD

## 2019-03-05 ENCOUNTER — DOCUMENTATION ONLY (OUTPATIENT)
Dept: RADIATION ONCOLOGY | Facility: CLINIC | Age: 59
End: 2019-03-05

## 2019-03-05 ENCOUNTER — APPOINTMENT (OUTPATIENT)
Dept: RADIATION ONCOLOGY | Facility: CLINIC | Age: 59
End: 2019-03-05
Attending: RADIOLOGY
Payer: COMMERCIAL

## 2019-03-05 LAB
BACTERIA SPEC CULT: NORMAL
Lab: NORMAL
SPECIMEN SOURCE: NORMAL

## 2019-03-05 PROCEDURE — 77386 ZZH IMRT TREATMENT DELIVERY, COMPLEX: CPT | Performed by: RADIOLOGY

## 2019-03-05 NOTE — PROGRESS NOTES
"Pt came back after radiation feeling \" a little off, trouble with thought process.\"  She reports not eating this am and has not been eating much recently r/t treatment and decreased appetite with diarrhea.  Pt sitting b/p 124/75  p 65, standing b/p 131/77  p 68.   Pt denies any HA and felt fine this am coming into radiation but after getting off the table felt very weak, SOB walking to room, O2 95% on RA.   Pt has a known UTI and will be picking up and starting her antibiotic today.  Hot cocoa and crackers brought to pt to eat.     Dr. Betancur notified of above, staffed by Dr. Jewell,  Concern for low blood sugar, pt given more hot cocoa and some soda to drink. Pt feeling better and able to answer questions appropriately.    RN reviewed the need to check blood sugars at least twice daily and document. RN also encouraged pt to keep a daily log of PO intake since she has not been eating as much with treatment.  Pt verbalizes understanding.    Pt will also  her antibiotic at [facility] on her way home.  "

## 2019-03-06 ENCOUNTER — APPOINTMENT (OUTPATIENT)
Dept: RADIATION ONCOLOGY | Facility: CLINIC | Age: 59
End: 2019-03-06
Attending: RADIOLOGY
Payer: COMMERCIAL

## 2019-03-06 PROCEDURE — 77386 ZZH IMRT TREATMENT DELIVERY, COMPLEX: CPT | Performed by: RADIOLOGY

## 2019-03-07 ENCOUNTER — OFFICE VISIT (OUTPATIENT)
Dept: SURGERY | Facility: CLINIC | Age: 59
End: 2019-03-07
Payer: COMMERCIAL

## 2019-03-07 ENCOUNTER — APPOINTMENT (OUTPATIENT)
Dept: RADIATION ONCOLOGY | Facility: CLINIC | Age: 59
End: 2019-03-07
Attending: RADIOLOGY
Payer: COMMERCIAL

## 2019-03-07 ENCOUNTER — ANESTHESIA EVENT (OUTPATIENT)
Dept: SURGERY | Facility: CLINIC | Age: 59
End: 2019-03-07

## 2019-03-07 VITALS
HEIGHT: 64 IN | DIASTOLIC BLOOD PRESSURE: 61 MMHG | HEART RATE: 67 BPM | TEMPERATURE: 98.1 F | BODY MASS INDEX: 49.73 KG/M2 | SYSTOLIC BLOOD PRESSURE: 142 MMHG | WEIGHT: 291.3 LBS | OXYGEN SATURATION: 97 % | RESPIRATION RATE: 18 BRPM

## 2019-03-07 DIAGNOSIS — Z01.818 PREOP EXAMINATION: Primary | ICD-10-CM

## 2019-03-07 DIAGNOSIS — C54.1 ENDOMETRIAL CANCER (H): ICD-10-CM

## 2019-03-07 LAB
ANION GAP SERPL CALCULATED.3IONS-SCNC: 7 MMOL/L (ref 3–14)
BUN SERPL-MCNC: 6 MG/DL (ref 7–30)
CALCIUM SERPL-MCNC: 8.7 MG/DL (ref 8.5–10.1)
CHLORIDE SERPL-SCNC: 105 MMOL/L (ref 94–109)
CO2 SERPL-SCNC: 28 MMOL/L (ref 20–32)
CREAT SERPL-MCNC: 1.04 MG/DL (ref 0.52–1.04)
GFR SERPL CREATININE-BSD FRML MDRD: 59 ML/MIN/{1.73_M2}
GLUCOSE SERPL-MCNC: 160 MG/DL (ref 70–99)
HBA1C MFR BLD: 9 % (ref 0–5.6)
INR PPP: 1.37 (ref 0.86–1.14)
NT-PROBNP SERPL-MCNC: 57 PG/ML (ref 0–125)
POTASSIUM SERPL-SCNC: 3.5 MMOL/L (ref 3.4–5.3)
SODIUM SERPL-SCNC: 140 MMOL/L (ref 133–144)

## 2019-03-07 PROCEDURE — 77386 ZZH IMRT TREATMENT DELIVERY, COMPLEX: CPT | Performed by: RADIOLOGY

## 2019-03-07 RX ORDER — RIVAROXABAN 20 MG/1
20 TABLET, FILM COATED ORAL AT BEDTIME
Refills: 12 | COMMUNITY
Start: 2019-02-28

## 2019-03-07 RX ORDER — FLUTICASONE PROPIONATE 50 MCG
2 SPRAY, SUSPENSION (ML) NASAL AT BEDTIME
Refills: 12 | COMMUNITY
Start: 2019-02-28

## 2019-03-07 ASSESSMENT — PATIENT HEALTH QUESTIONNAIRE - PHQ9
SUM OF ALL RESPONSES TO PHQ QUESTIONS 1-9: 16
SUM OF ALL RESPONSES TO PHQ QUESTIONS 1-9: 16
10. IF YOU CHECKED OFF ANY PROBLEMS, HOW DIFFICULT HAVE THESE PROBLEMS MADE IT FOR YOU TO DO YOUR WORK, TAKE CARE OF THINGS AT HOME, OR GET ALONG WITH OTHER PEOPLE: SOMEWHAT DIFFICULT

## 2019-03-07 ASSESSMENT — MIFFLIN-ST. JEOR: SCORE: 1886.33

## 2019-03-07 NOTE — H&P
Pre-Operative H & P     CC:  Preoperative exam to assess for increased cardiopulmonary risk while undergoing surgery and anesthesia.    Date of Encounter: 3/7/2019  Primary Care Physician:  No Ref-Primary, Physician  Reason for admission: Device insertion for Endometrial Cancer treatment    HPI  Lilli Locke is a 57 y/o female who presents for pre-operative H&P in preparation for insertion of interstitial needles with Laparoscopic guidance and Ultrasound guidance with Elsy Elizabeth MD on 3/18/19 at [facility] for treatment of endometrial cancer. The needles are scheduled to be removed on 3/20/19.    She has a recent diagnosis of centrally recurrent endometrial cancer. She is status post diagnostic laparoscopy converted to exploratory laparotomy, LELE, BSO, BPLND, omentectomy, cysto on 8-. Final pathology showed a FIGO Grade 1 endometrioid SUE, 6.5cm greatest dimension, <50% myometrial invasion (0.5/3.2cm), no LVI, negative cytology, negative margins, negative nodes. Omentum, adnexa negative. Patient's postoperative course was complicated by readmission with PE (prior history of PE, discharged on home rivaroxaban) and nausea and vomiting. The patient had limited follow-up. Was seen in Gyn Onc clinic at [facility] in 9/2014 and most recently, in 4/2016, with recs for six month follow-up.    The patient sought care in 11/2017 for vaginal bleeding and was seen by a local Ob/Gyn. Exam was negative. However, since that time, she reportedly has been bleeding regularly. She had a CT 11/18 which was negative, US 11/18 also negative. She had a pap smear by her PCP showing AGC worrisome for malignancy. She was then seen by another Ob/Gyn at [facility] on 12/17/18. Exam notable for lesion at vaginal cuff which was biopsied. Path showed invasive SUE consistent with endometrial origin. PET on 12/29/18 showed soft tissue thickening at vaginal cuff concerning for recurrence with loss  of fat plane at anterior wall rectum and posterior wall bladder. No metastatic disease.     History is also significant for DM type 2 (daily insulin & metformin), QUINTIN (uses CPAP), morbid obesity (BMI 55), and hx unprovoked PE in 2013 & 2nd PE following hysterectomy in  (no clots since starting Xarelto in ).    History is obtained from the patient.     Past Medical History  Past Medical History:   Diagnosis Date     Antiplatelet or antithrombotic long-term use     on Xarelto since  for unprovoked PE     Complication of anesthesia     PONV following hysterectomy; tolerated MAC well w/ colonoscopy      Diabetes mellitus type 2, insulin dependent (H)     on insulin & metformin     Diabetic neuropathy, type II diabetes mellitus (H)     hands & feet     Dyspnea on exertion      Endometrial cancer (H)      GERD (gastroesophageal reflux disease)     on omeprazole     Hx of pulmonary embolus      Mixed hyperlipidemia      Obesity     BMI 55     Obstructive sleep apnea     has CPAP     PONV (postoperative nausea and vomiting)      Substance abuse (H)      Uncomplicated asthma     Uses albuterol inhaler 1-2x/yr mostly during summer months       Past Surgical History  Past Surgical History:   Procedure Laterality Date      SECTION      x2     DENTAL SURGERY      wisdom teeth extraction                HYSTERECTOMY TOTAL ABDOMINAL, BILATERAL SALPINGO-OOPHORECTOMY, COMBINED      cancer     TUBAL LIGATION         Hx of Blood transfusions/reactions: No     Hx of abnormal bleeding or anti-platelet use: No (on Xarelto)    Menstrual history: No LMP recorded. Patient has had a hysterectomy.: N/A    Steroid use in the last year: No    Personal or FH with difficulty with Anesthesia:  PONV following hysterectomy, tolerated MAC well, no FH    Prior to Admission Medications  Current Outpatient Medications   Medication Sig Dispense Refill     albuterol (VENTOLIN HFA) 108 (90 Base) MCG/ACT inhaler Inhale 1-2  puffs into the lungs as needed       atorvastatin (LIPITOR) 40 MG tablet Take 40 mg by mouth At Bedtime        docusate sodium (COLACE) 100 MG capsule Take 100 mg by mouth as needed for constipation        FLUoxetine (PROZAC) 20 MG capsule Take 40 mg by mouth At Bedtime        GABAPENTIN PO Take 300 mg by mouth 2 times daily  11     insulin lispro prot & lispro (HUMALOG MIX 75/25 PEN) (75-25) 100 UNIT/ML pen Inject 90 Units Subcutaneous 2 times daily 90 units in am, 70 units in pm       METFORMIN HCL PO Take 1,000 mg by mouth 2 times daily (with meals)        omeprazole (PRILOSEC) 20 MG capsule Take 20 mg by mouth At Bedtime                                        albuterol (PROVENTIL) (2.5 MG/3ML) 0.083% neb solution Inhale 3 mLs into the lungs as needed        fluticasone (FLONASE) 50 MCG/ACT nasal spray 2 sprays At Bedtime  12     XARELTO 20 MG TABS tablet Take 20 mg by mouth At Bedtime  12       Allergies  Allergies   Allergen Reactions     Asa [Aspirin] GI Disturbance     Food Diarrhea     Intolerant to milk only, broccoli and corn - causes diarrhea.     Latex      PN: Converted from LW Latex Sensitivity Flag     Lisinopril Cough     Prochlorperazine Other (See Comments) and Unknown     Psychotic reaction       Social History  Social History     Socioeconomic History     Marital status:      Spouse name: Not on file     Number of children: Not on file     Years of education: Not on file     Highest education level: Not on file   Occupational History     Not on file   Social Needs     Financial resource strain: Not on file     Food insecurity:     Worry: Not on file     Inability: Not on file     Transportation needs:     Medical: Not on file     Non-medical: Not on file   Tobacco Use     Smoking status: Never Smoker     Smokeless tobacco: Never Used   Substance and Sexual Activity     Alcohol use: No     Alcohol/week: 0.0 oz     Frequency: Never     Drug use: No     Sexual activity: Not on file    Lifestyle     Physical activity: Unable to walk 1 block w/o stopping     Days per week: Not on file     Minutes per session: Not on file     Stress: Not on file   Relationships     Social connections:     Talks on phone: Not on file     Gets together: Not on file     Attends Sabianist service: Not on file     Active member of club or organization: Not on file     Attends meetings of clubs or organizations: Not on file     Relationship status: Not on file     Intimate partner violence:     Fear of current or ex partner: Not on file     Emotionally abused: Not on file     Physically abused: Not on file     Forced sexual activity: Not on file   Other Topics Concern     Not on file   Social History Narrative     Not on file       Family History  Family History   Problem Relation Age of Onset     Cancer Mother         ovarian     Cancer Maternal Grandmother         Breast      Deep Vein Thrombosis Granddaughter         dvt following birth of baby     Cardiac Sudden Death No family hx of      Myocardial Infarction No family hx of      Anesthesia Reaction No family hx of          Hypertension    Birth father        Diabetes type II    Birth father    ROS/MED HX      The complete review of systems is negative other than noted in the HPI or here.    ENT/Pulmonary: Comment: Uses albuterol inhaler approx. 1-2x/year, more during summer months    (+)sleep apnea, Intermittent asthma Last exacerbation: 3/4/19,Treatment: Inhaler prn,  uses CPAP , . .    Neurologic: Comment: Neuropathy in hands & feet - on gabapentin    (+)neuropathy - diabetic neuropathy, hands & feet, on gabapentin,     Cardiovascular:     (+) Dyslipidemia, ----. Taking blood thinners Pt has not received instructions: Instructions Given to patient: stop Xarelto 72 hrs prior to surgery. . LANDRY, . :. . Previous cardiac testing Echodate:4/19/13results:Stress Testdate:9/8/00 results:ECG reviewed date:2/5/15 results: date: results:          METS/Exercise Tolerance:  Comment: Unable to walk 1 block w/o stopping, avoids stairs, able to dust & do light housework, denies CP/angina 1 - Eating, dressing   Hematologic: Comments: Had unprovoked PE, started coumadin 2013, switched to xarelto after hysterectomy    (+) History of blood clots pt is anticoagulated, -      Musculoskeletal:  - neg musculoskeletal ROS       GI/Hepatic: Comment: On omeprazole    (+) GERD Asymptomatic on medication,       Renal/Genitourinary:  - ROS Renal section negative   (+) Pt has no history of transplant,       Endo: Comment: Takes daily insulin & metformin    (+) type II DM Last HgA1c: 9.5 date: 11/5/18 Using insulin - not using insulin pump Normal glucose range: 108-141 per pt report not previously admitted for DM/DKA Diabetic complications: neuropathy, Obesity, .      Psychiatric:     (+) psychiatric history depression (on prozac)      Infectious Disease:  - neg infectious disease ROS       Malignancy:   (+) Malignancy History of Other  Other CA vaginal cuff (prior hysterectomy for uterine CA in 2013) Active status post Surgery         Other:    (+) No chance of pregnancy no H/O Chronic Pain,             PHYSICAL EXAM:   Mental Status/Neuro: A/A/O; Age Appropriate   Airway: Facies: Thick Neck  Mallampati: III  Mouth/Opening: Full  TM distance: > 6 cm  Neck ROM: Limited   Respiratory: Auscultation: CTAB     Resp. Rate: Normal     Resp. Effort: Normal      CV: Rhythm: Regular  Rate: Age appropriate  Heart: Normal Sounds   Comments:      Dental: Normal              Preop Vitals  BP Readings from Last 3 Encounters:   03/07/19 142/61   03/04/19 120/75   02/25/19 124/53    Pulse Readings from Last 3 Encounters:   03/07/19 67   03/04/19 57   02/25/19 64      Resp Readings from Last 3 Encounters:   03/07/19 18    SpO2 Readings from Last 3 Encounters:   03/07/19 97%   02/18/19 94%   02/12/19 96%      Temp Readings from Last 1 Encounters:   03/07/19 98.1  F (36.7  C) (Oral)    Ht Readings from Last 1 Encounters:  "  03/07/19 1.626 m (5' 4\")      Wt Readings from Last 1 Encounters:   03/07/19 132.1 kg (291 lb 4.8 oz)    Estimated body mass index is 50 kg/m  as calculated from the following:    Height as of this encounter: 1.626 m (5' 4\").    Weight as of this encounter: 132.1 kg (291 lb 4.8 oz).      Temp: 98.1  F (36.7  C) Temp src: Oral BP: 142/61 Pulse: 67   Resp: 18 SpO2: 97 %         291 lbs 4.8 oz  5' 4\"   Body mass index is 50 kg/m .       Physical Exam  Constitutional: Awake, alert, cooperative, no apparent distress, and appears stated age.  Eyes: Pupils equal, round and reactive to light, extra ocular muscles intact, sclera clear, conjunctiva normal.  HENT: Normocephalic, oral pharynx with moist mucus membranes, good dentition. No goiter appreciated. No dental hardware.   Respiratory: Clear to auscultation bilaterally, no crackles or wheezing.  Cardiovascular: Regular rate and rhythm, normal S1 and S2, and no murmur noted.  Carotids +2, no bruits. No edema. Palpable pulses to radial, DP and PT arteries. No LE edema.  GI: Normal bowel sounds, soft, obese, non-distended, non-tender, no masses palpated, no hepatomegaly appreciated.  Surgical scar from prior laparotomy well healed.  Lymph/Hematologic: No cervical lymphadenopathy and no supraclavicular lymphadenopathy.  Genitourinary:  deferred  Skin: Warm and dry.  No rashes at anticipated surgical site.   Musculoskeletal: Full/limited ROM of neck. Thick neck. There is no redness, warmth, or swelling of the joints. Gross motor strength is normal.    Neurologic: Awake, alert, oriented to name, place and time. Cranial nerves II-XII are grossly intact. Gait is normal.   Neuropsychiatric: Calm, cooperative. Normal affect.     PRIOR DIAGNOSTIC STUDIES:  WBC 4.00 - 10.00 k/cmm 5.01    RBC 3.90 - 5.20 m/cmm 4.40    Hgb 11.5 - 15.7 g/dL 13.2    Hematocrit 34.0 - 45.0 % 40.7    MCV 80.0 - 100.0 fL 92.5    MCH 25.0 - 32.0 pg 30.0    MCHC 31.0 - 36.0 g/dL 32.4    RDW 11.5 - 14.5 % " 13.6    Plt 150 - 400 k/cmm 224    Comment: Fibrin and-or Platelet clumps are present; the value may be falsely decreased due to clumping.   MPV 6.5 - 12.5 fL 11.7    NRBC 0.0 - 0.0 % 0.0    CBC Plt Performed at:   [facility]    Comment: [facility]   [address] [place] [id number]     Hemoglobin A1C 4.0 - 6.0 % 9.5 Abnormally high     Estimated Average Glucose  mg/dL 226    Comment: The ADA recommends reporting an estimated Average   Glucose (eAG) with all hemoglobin A1c results   using the equation derived from a study of 501   normal diabetic adults. Minority populations were   underrepresented and children were not included.   The EAG is not equivalent to a fasting glucose.   Glyc Hgb A1C and eAG performed at:   [facility]    Comment: [facility]   [address] [place] [id number]     MR PELVIS (GYN) WO & W CONTRAST, 1/25/2019:  Surgical changes of hysterectomy and bilateral  salpingo-oophorectomy. Irregular, T2 intermediately intense, enhancing  soft tissue about the right vaginal cuff measuring approximately 2.1 x  3.6 x 1.8 cm (series 13, image 18, series 4, image 13, and series 5,  image 14) with lobular extension posteriorly invading the anterior  aspect of the lower rectal wall (series 4, image 13 and series 13,  image 17). Small amount of perirectal free fluid above the enhancing  lesion. No evidence of involvement of the bladder or ureters.  1. Recurrent tumor at the right vaginal cuff with involvement of the  adjacent anterior rectal wall. The bladder is uninvolved.  2. No pelvic lymphadenopathy.     VAGINAL CUFF, BIOPSY 12/17/18:   - Invasive adenocarcinoma, consistent with recurrent endometrial   adenocarcinoma    PET/CT SKULLBASE TO MID-THIGH:  PET/CT SKULLBASE TO MID-THIGH (12/27/2018 9:48 AM CST)   Impressions Performed At   Impression: Evaluation limited by artifact related to body habitus    1. Hysterectomy with irregular soft tissue thickening at the vaginal cuff with profound FDG  uptake compatible with concern for endometrial cancer recurrence. Loss of the normal intervening fat plane at the anterior wall of the rectum and posterior wall of the urinary bladder concerning for adjacent organ invasion. Consider MRI pelvis with contrast for improved delineation of local disease involvement.    2. No evidence for metastatic disease in the chest, abdomen, pelvis or skeletal structures.    3. Incidental findings: Colonic diverticulosis. Mild cardiomegaly.      EKG 2/5/15:  Sinus rhythm  Left axis deviation  Minimal voltage criteria for LVH, may be normal variant  Abnormal ECG  When compared with ECG of 12-SEP-2013 06:35,  No significant change was found  Confirmed by MD MCNEAL JOHANNES ([id number]),  MARIANA TROY   ([id number]) on 2/5/2015 10:47:53 AM     ECHOCARDIOGRAM ([facility]) 04/19/2013:  CONCLUSIONS  Normal LV size and global and regional function.  Left ventricular ejection fraction is visually estimated at 65%.  Normal LA and RA size.  Normal structure and function of all valves.  There is no visible thrombus at the IVC RA juncture.  Image quality is suboptimal secondary to poor acoustic windows.     CARDIAC PERF:REST MIBI NM 08/29/2000:   SYMPTOMS AND REASONS:TEST WITH CONCURRENT BETA BLOC KER:  NO  WORKING DIAGNOSIS:  EVALUATE PRESENCE OF ISCHEMIA:  NO PRIOR HISTORY OF   CAD    FINDINGS:  Rest/stress myocardial perfusion imaging was performed using  31.5 mCi of Tc 99m Sestamibi at rest and 33 mCi of Tc 99m Sestamibi   Given during peak treadmill exercise on a 2-day protocol.  The patient   Exercised 7 minutes on a Geoff protocol achieving a maximum heart rate of 153   Which represents 85% of the predicted maximum.  Review of the rest/stress three-plane SPECT reconstructed scintigrams  demonstrates normal tracer uptake in all segments.  IMPRESSION:  Normal study.  Left ventricular size and function are normal.    Labs today: (personally reviewed)  BMP - WNL  INR 1.37  A1c   9.0  BNP 57    Outside records reviewed from: Care Everywhere    ASSESSMENT and PLAN  Lilli Locke is a 58 year old female scheduled to undergo insertion of interstitial needles with Laparoscopic guidance and Ultrasound guidance with Elsy Elizabeth MD on 3/18/19 at [facility] for treatment of endometrial cancer. The needles are scheduled to be removed on 3/20/19.   She has the following specific operative considerations:   - RCRI : No serious cardiac risks.  0.4% risk of major adverse cardiac event.   - Anesthesia considerations:  Refer to PAC assessment in anesthesia records  - VTE risk: 4.5% (BMI >40, hx VTE, current cancer)  - QUINTIN # of risks - uses CPAP for QUINTIN  - Risk of PONV score = 4.  If > 2, anti-emetic intervention recommended.    Pt has had prior anesthetic. Type: General and MAC    History of anesthetic complications   - PONV  PONV following hysterectomy; tolerated MAC w/ colonscopy 2018    1) Cardiac: METS 1, unable to walk 1 block w/o stopping, avoids stairs, denies CP/angina (see prior studies above)  2) Pulmonary: + QUINTIN, uses CPAP; non-smoker; pt instructed to use albuterol inhaler morning of surgery & bring w/ her; instructed to bring CPAP equipment also  3) GI: GERD well managed on omeprazole, pt instructed to take med evening prior to surgery  4) : active vaginal cuff cancer, renal function WNL  5) Endo: BMI 55; Diabetes type 2, on daily insulin & metformin, A1c 9.0 (down from 9.5 on 11/5/18)  6) Heme: hx unprovoked PE, on Xarelto since 2013, will stop 72 hours before surgery  7) Ortho: Full/limited Neck ROM, thick neck    * Increased risk for pressure ulcers, consider mattress choice   * Increased risk for DVT/PE    Patient was discussed with Dr Castillo. Dr Elizabeth staff messaged regarding elevated A1c.    Arrival time, NPO, shower and medication instructions provided by nursing staff today.  Preparing For Your Surgery handout given.    Vijaya HENDERSON  ERON Baugh  Preoperative Assessment Center  [facility]  Clinic and Surgery Center  Phone: [phone]  Fax: [phone]

## 2019-03-07 NOTE — PATIENT INSTRUCTIONS
Preparing for Your Surgery      Name:  Lilli Locke   MRN:  [id number]   :  1960   Today's Date:  3/7/2019     Arriving for surgery:  Surgery date:  2019  Arrival time:  5:30 AM  Please come to:       [facility] [address]  [address] [place]  [address]    -   parking is available in front of the hospital from 5:15 am to 8:00 pm    -  Stop at the Information Desk in the lobby    -   Inform the information person that you are here for surgery. An escort to 3c will be provided. If you would not like an escort, please proceed to 3C on the 3rd floor. [phone]     What can I eat or drink?  -  You may have solid food or milk products until 8 hours prior to your surgery. 11:30 PM  -  You may have water, apple juice or 7up/Sprite until 2 hours prior to your surgery. 5:30 AM      -  Nothing after 5:30 AM    Which medicines can I take?  Stop Aspirin, vitamins and supplements one week prior to surgery.  Hold Ibuprofen, Naproxen and Xarelto for 48 hours prior to surgery.   Insulin in the evening 56 units  -  Do NOT take these medications in the morning, the day of surgery:   Insulin and metformin    -  Please take these medications the day of surgery:   May take other morning medications as needed including pain medications and inhalers      How do I prepare myself?  -  Take two showers: one the night before surgery; and one the morning of surgery.         Use Scrubcare or Hibiclens to wash from neck down.  You may use your own     shampoo and conditioner. No other hair products.   -  Do NOT use lotion, powder, deodorant, or antiperspirant the day of your surgery.  -  Do NOT wear any makeup, fingernail polish or jewelry.  -  Begin using Incentive Spirometer 1 week prior to surgery.  Use 4 times per day, up    to 5 breaths each time.  Bring Incentive Spirometer to hospital.  - Do not bring your own medications to the hospital, except for  inhalers and eye   drops.  -  Bring your ID and insurance card.    Questions or Concerns:  -If you have questions or concerns regarding the day of surgery, please call [phone].     -For questions after surgery please call your surgeons office.   AFTER YOUR SURGERY  Breathing exercises   Breathing exercises help you recover faster. Take deep breaths and let the air out slowly. This will:     Help you wake up after surgery.    Help prevent complications like pneumonia.  Preventing complications will help you go home sooner.   We may give you a breathing device (incentive spirometer) to encourage you to breathe deeply.   Nausea and vomiting   You may feel sick to your stomach after surgery; if so, let your nurse know.    Pain control:  After surgery, you may have pain. Our goal is to help you manage your pain. Pain medicine will help you feel comfortable enough to do activities that will help you heal.  These activities may include breathing exercises, walking and physical therapy.   To help your health care team treat your pain we will ask: 1) If you have pain  2) where it is located 3) describe your pain in your words  Methods of pain control include medications given by mouth, vein or by nerve block for some surgeries.  We may give you a pain control pump that will:  1) Deliver the medicine through a tube placed in your vein  2) Control the amount of medicine you receive  3) Allow you to push a button to deliver a dose of pain medicine  Sequential Compression Device (SCD) or Pneumo Boots:  You may need to wear SCD S on your legs or feet. These are wraps connected to a machine that pumps in air and releases it. The repeated pumping helps prevent blood clots from forming.         Using an Incentive Spirometer    An incentive spirometer is a device that helps you do deep breathing exercises. These exercises expand your lungs, aid in circulation, and help prevent pneumonia. Deep breathing exercises also help you  breathe better and improve the function of your lungs by:    Keeping your lungs clear    Strengthening your breathing muscles    Helping prevent respiratory complications or problems  The incentive spirometer gives you a way to take an active part in your care. A nurse or therapist will teach you breathing exercises. To do these exercises, you will breathe in through your mouth and not your nose. The incentive spirometer only works correctly if you breathe in through your mouth.    Steps to clear lungs  Step 1. Exhale normally. Then, inhale normally.    Relax and breathe out.  Step 2. Place your lips tightly around the mouthpiece.    Make sure the device is upright and not tilted.  Step 3. Inhale as much air as you can through the mouthpiece (don't breath through your nose).    Inhale slowly and deeply.    Hold your breath long enough to keep the balls or disk raised for at least 3 to 5 seconds, or as instructed by your healthcare provider.  Step 4. Repeat the exercise regularly.    Begin using the Incentive Spirometer one week prior to your surgery, 4 times per day-5 times each.

## 2019-03-07 NOTE — PHARMACY - PREOPERATIVE ASSESSMENT CENTER
Anticoagulation Note - Preoperative Assessment Center (PAC) Pharmacist     Patient seen and interviewed during time of PAC Clinic appointment March 7, 2019.  The purpose of this note is to document the perioperative anticoagulation plan outlined by the providers caring for Lilli Locke.     Current Regimen  Anticoagulation Regimen as of March 7, 2019: xarelto 20mg by mouth daily   Indication: PE/DVT  Prescriber:  Dr. José Le  Expected Duration of therapy: indefinite    Perioperative plan  Lilli Locke is scheduled for insertion of interstitial needles on 3/18/19 with Dr. Elizabeth and the perioperative anticoagulation plan outlined by the PAC clinic is for patient to hold xarelto 48 hours prior to the procedure with last dose being on 3/15/19.     Resumption of anticoagulation after procedure will be based on surgery team assessment of bleeding risks and complications.  This plan may require re-assessment and modification by her primary team in the perioperative setting depending on patients clinical situation.        Ady Stone RPH  March 7, 2019  11:44 AM

## 2019-03-08 ENCOUNTER — APPOINTMENT (OUTPATIENT)
Dept: RADIATION ONCOLOGY | Facility: CLINIC | Age: 59
End: 2019-03-08
Attending: RADIOLOGY
Payer: COMMERCIAL

## 2019-03-08 PROCEDURE — 77386 ZZH IMRT TREATMENT DELIVERY, COMPLEX: CPT | Performed by: RADIOLOGY

## 2019-03-08 PROCEDURE — 77336 RADIATION PHYSICS CONSULT: CPT | Performed by: RADIOLOGY

## 2019-03-08 ASSESSMENT — PATIENT HEALTH QUESTIONNAIRE - PHQ9: SUM OF ALL RESPONSES TO PHQ QUESTIONS 1-9: 16

## 2019-03-11 ENCOUNTER — OFFICE VISIT (OUTPATIENT)
Dept: RADIATION ONCOLOGY | Facility: CLINIC | Age: 59
End: 2019-03-11
Attending: RADIOLOGY
Payer: COMMERCIAL

## 2019-03-11 VITALS
SYSTOLIC BLOOD PRESSURE: 146 MMHG | BODY MASS INDEX: 50.6 KG/M2 | HEART RATE: 72 BPM | WEIGHT: 293 LBS | DIASTOLIC BLOOD PRESSURE: 77 MMHG

## 2019-03-11 DIAGNOSIS — R19.7 DIARRHEA, UNSPECIFIED TYPE: Primary | ICD-10-CM

## 2019-03-11 PROCEDURE — 77386 ZZH IMRT TREATMENT DELIVERY, COMPLEX: CPT | Performed by: RADIOLOGY

## 2019-03-11 RX ORDER — DIPHENOXYLATE HCL/ATROPINE 2.5-.025MG
2 TABLET ORAL 4 TIMES DAILY PRN
Qty: 60 TABLET | Refills: 0 | Status: SHIPPED | OUTPATIENT
Start: 2019-03-11 | End: 2019-04-23

## 2019-03-11 NOTE — LETTER
3/11/2019       RE: Lilli Locke  [address] [place] [address]     Dear Colleague,    Thank you for referring your patient, Lilli Locke, to the RADIATION ONCOLOGY CLINIC. Please see a copy of my visit note below.    [facility] PHYSICIANS  SPECIALIZING IN BREAKTHROUGHS  Radiation Oncology    On Treatment Visit Note    Lilli Locke      Date: 3/11/2019   MRN: [id number]   : 1960  Diagnosis: Endometrial Cancer    Reason for Visit:  On Radiation Treatment Visit     Treatment Summary to Date  Treatment Site: Pelvis Current Dose: 3780/4500 + 5 cGy Fractions:        Chemotherapy  Chemo concurrent with radx?: No    Subjective: Ms. Locke presents today for her weekly on treatment visits. She reports ongoing diarrhea. She takes Imodium up to 8 pills a day but still feels she is going all the time. She takes 2 tabs in the morning, then one tab after each diarrhea. She denies any changes in her pain, vaginal spotting or weight.  She is regularly checking her blood sugar.     Nursing ROS:   Nutrition Alteration  Diet Type: Patient's Preference  Nutrition Note: appetite low, r/t diarreah    Skin  Skin Reaction: 0 - No changes  Skin Note: takes percocet in the evenimg PRN, about 6/week     Gastrointestinal  Nausea: 0 - None(Occ. nausea no meds needed at this time. )  Diarrhea: 4 - Nine or more soft or liquid bowel movements per day(talked about imodium. gave diarreah diet)  GI Note: Will schedule for pt to meet with Juliana    Genitourinary   Note: WNL    Psychosocial  Pyschosocial Note: feeling tired    Pain Assessment  0-10 Pain Scale: 0  Pain Note: no meds needed    Objective:   /77   Pulse 72   Wt 133.7 kg (294 lb 12.8 oz)   BMI 50.60 kg/m     Gen: Appears well, in no acute distress    Labs:  CBC RESULTS: No results for input(s): WBC, RBC, HGB, HCT, MCV, MCH, MCHC, RDW, PLT in the last 09761 hours.  ELECTROLYTES:  Recent Labs   Lab Test  03/07/19  1156      POTASSIUM 3.5   CHLORIDE 105   NEFTALI 8.7   CO2 28   BUN 6*   CR 1.04   *     Assessment:  She is tolerating her radiation therapy well.  Diarrhea not well controlled by Imodium. All questions and concerns addressed.    Toxicities:  Diarrhea: grade III  Fatigue: grade I      Plan:   1. Continue current therapy.  2. MRI scan tomorrow as scheduled   3. Prescribed Lomotil.  She is instructed to discontinue Imodium and use Lomotil only for now.  4. Rule out C diff.   5. Follow up with PCP for better diabetes control     6. Discussed up coming brachy.      Mosaiq chart and setup information reviewed  Ports checked    Medication Review  Med list reviewed with patient?: Yes    Educational Topic Discussed  Additional Instructions: No vaginal bleeding today.  Education Instructions: Reviewed        Armando Betancur MD  PGY-2 Resident, Radiation Oncology  [facility]    I saw and examined the patient with the resident.  I have reviewed and edited the resident's note and agree with the plan of care.      Elsy Elizabeth MD    Again, thank you for allowing me to participate in the care of your patient.      Sincerely,    Elsy Elizabeth MD

## 2019-03-11 NOTE — LETTER
Date:March 12, 2019      Provider requested that no letter be sent. Do not send.       [facility] Health Information

## 2019-03-11 NOTE — PROGRESS NOTES
[facility] PHYSICIANS  SPECIALIZING IN BREAKTHROUGHS  Radiation Oncology    On Treatment Visit Note    Lilli oLcke      Date: 3/11/2019   MRN: [id number]   : 1960  Diagnosis: Endometrial Cancer    Reason for Visit:  On Radiation Treatment Visit     Treatment Summary to Date  Treatment Site: Pelvis Current Dose: 3780/4500 + 5 cGy Fractions:        Chemotherapy  Chemo concurrent with radx?: No    Subjective: Ms. Locke presents today for her weekly on treatment visits. She reports ongoing diarrhea. She takes Imodium up to 8 pills a day but still feels she is going all the time. She takes 2 tabs in the morning, then one tab after each diarrhea. She denies any changes in her pain, vaginal spotting or weight.  She is regularly checking her blood sugar.     Nursing ROS:   Nutrition Alteration  Diet Type: Patient's Preference  Nutrition Note: appetite low, r/t diarreah    Skin  Skin Reaction: 0 - No changes  Skin Note: takes percocet in the evenimg PRN, about 6/week     Gastrointestinal  Nausea: 0 - None(Occ. nausea no meds needed at this time. )  Diarrhea: 4 - Nine or more soft or liquid bowel movements per day(talked about imodium. gave diarreah diet)  GI Note: Will schedule for pt to meet with Juliana    Genitourinary   Note: WNL    Psychosocial  Pyschosocial Note: feeling tired    Pain Assessment  0-10 Pain Scale: 0  Pain Note: no meds needed    Objective:   /77   Pulse 72   Wt 133.7 kg (294 lb 12.8 oz)   BMI 50.60 kg/m    Gen: Appears well, in no acute distress    Labs:  CBC RESULTS: No results for input(s): WBC, RBC, HGB, HCT, MCV, MCH, MCHC, RDW, PLT in the last 35763 hours.  ELECTROLYTES:  Recent Labs   Lab Test 19  1156      POTASSIUM 3.5   CHLORIDE 105   NEFTALI 8.7   CO2 28   BUN 6*   CR 1.04   *     Assessment:  She is tolerating her radiation therapy well.  Diarrhea not well controlled by Imodium. All questions and concerns  addressed.    Toxicities:  Diarrhea: grade III  Fatigue: grade I      Plan:   1. Continue current therapy.  2. MRI scan tomorrow as scheduled   3. Prescribed Lomotil.  She is instructed to discontinue Imodium and use Lomotil only for now.  4. Rule out C diff.   5. Follow up with PCP for better diabetes control     6. Discussed up coming brachy.      Mosaiq chart and setup information reviewed  Ports checked    Medication Review  Med list reviewed with patient?: Yes    Educational Topic Discussed  Additional Instructions: No vaginal bleeding today.  Education Instructions: Reviewed        Armando Betancur MD  PGY-2 Resident, Radiation Oncology  [facility]    I saw and examined the patient with the resident.  I have reviewed and edited the resident's note and agree with the plan of care.      Elsy Elizabeth MD

## 2019-03-12 ENCOUNTER — APPOINTMENT (OUTPATIENT)
Dept: RADIATION ONCOLOGY | Facility: CLINIC | Age: 59
End: 2019-03-12
Attending: RADIOLOGY
Payer: COMMERCIAL

## 2019-03-12 PROCEDURE — 77386 ZZH IMRT TREATMENT DELIVERY, COMPLEX: CPT | Performed by: RADIOLOGY

## 2019-03-13 ENCOUNTER — HOSPITAL ENCOUNTER (OUTPATIENT)
Dept: NUTRITION | Facility: CLINIC | Age: 59
Discharge: HOME | End: 2019-03-13
Attending: DIETITIAN, REGISTERED | Admitting: DIETITIAN, REGISTERED
Payer: COMMERCIAL

## 2019-03-13 ENCOUNTER — APPOINTMENT (OUTPATIENT)
Dept: RADIATION ONCOLOGY | Facility: CLINIC | Age: 59
End: 2019-03-13
Attending: RADIOLOGY
Payer: COMMERCIAL

## 2019-03-13 PROCEDURE — 77386 ZZH IMRT TREATMENT DELIVERY, COMPLEX: CPT | Performed by: RADIOLOGY

## 2019-03-13 PROCEDURE — 97802 MEDICAL NUTRITION INDIV IN: CPT | Performed by: DIETITIAN, REGISTERED

## 2019-03-13 NOTE — PROGRESS NOTES
"CLINICAL NUTRITION SERVICES - ASSESSMENT NOTE    Lilli Locke 58 year old referred for MNT related to endometrial cancer    Time Spent: 30 minutes  Visit Type: initial  Referring Physician: Glenn  Pt accompanied by: self    NUTRITION HISTORY  Factors affecting nutrition intake include:diarrhea  Current diet: general  Current appetite/intake: good  Radiation: 22/25 fractions completed       Amelie would like to improver her eating habits to help reduce her BG, A1C and lose weight.   She admits to eating high carbs but does not quite know how to count them.  She has received education but it has been 'a very long time'.   She typically eats 3 meals/day and snacks on occasion.     Diet Recall  Breakfast [place] Oatmeal (fruit, granola) and egg sausage Mcmuffin, coffee with '14 sugars and 4 creams'.    Lunch Skips or sandwich on bread with turkey and cheese, chips, water   Dinner 2 svg spaghetti, chili, casseroles, tuna or salads     Her breakfast alone with coffee, mcmuffin and oatmeal contains almost 9 carb choices. She does not always have both the oatmeal and breakfast sandwich, she will have one of the other.    She expresser her willingness to omit coffee have small orange juice. This would reduce carbs by almost 4-5 choices.     ANTHROPOMETRICS  Height: 64\"  Weight: 291 lb/132kg  BMI: 50  Weight Status:  Obesity Grade III BMI >40  IBW: 120 lbs  % IBW: 242%  Weight History: fluctuates 3-4 lbs  Wt Readings from Last 6 Encounters:   03/11/19 133.7 kg (294 lb 12.8 oz)   03/07/19 132.1 kg (291 lb 4.8 oz)   02/25/19 134.7 kg (297 lb)   02/18/19 133.2 kg (293 lb 11.2 oz)   02/12/19 133.4 kg (294 lb)   02/11/19 135.1 kg (297 lb 12.8 oz)     Dosing Weight: 162 lb/74kg    Medications/vitamins/minerals/herbals:   Reviewed    Labs:   Labs reviewed    ASSESSED NUTRITION NEEDS:  Estimated Energy Needs:1500 kcals (15 Kcal/Kg)  Justification: maintenance and obese  Estimated Protein Needs: 75 grams protein (1g " "pro/Kg)  Justification: maintenance  Estimated Fluid Needs: 2000  mL   Justification: maintenance    NUTRITION DIAGNOSIS:  Obesity related to excessive caloric intake, self-monitoring deficit as evidenced by BMI >40    INTERVENTIONS  Recommendations / Nutrition Prescription  1. Limit carb choices to 3/meal and 1/snack  2. Limit calorie intake to <1500-kcal/day  Nutrition Education  Provided written & verbal education:   Discussed carb counting. Reviewed serving sizes.  Encouraged pt to limit carbs to 3 choices/meal (TID) and 1 with snacks (BID)    Discussed dietary and behavioral modifications to promote weight loss (portion control, meal consistency, measuring foods, 9\"portion plate method, fiber sources, protein sources, eating pace, exercise and avoidance of disordered eating patterns.  Advised pt to try aiming for <1800kacl/day for desired wt loss.     Provided pt with corresponding education materials/handouts on:  1400kcal meal plan, Carbohydrate Counting    Goals  1.  Weight loss as able (1-2 lb/week desirable)  2.  Aim for <1500kcal/day  3.  Carb counting - <3 choices/meal and 1/snack    Follow-Up Plans: Pt has RD contact information for questions.      Юлия Prieto RDN, LD      "

## 2019-03-14 ENCOUNTER — APPOINTMENT (OUTPATIENT)
Dept: RADIATION ONCOLOGY | Facility: CLINIC | Age: 59
End: 2019-03-14
Attending: RADIOLOGY
Payer: COMMERCIAL

## 2019-03-14 PROCEDURE — 77386 ZZH IMRT TREATMENT DELIVERY, COMPLEX: CPT | Performed by: RADIOLOGY

## 2019-03-15 ENCOUNTER — ANESTHESIA EVENT (OUTPATIENT)
Dept: SURGERY | Facility: CLINIC | Age: 59
DRG: 744 | End: 2019-03-15
Payer: COMMERCIAL

## 2019-03-15 ENCOUNTER — APPOINTMENT (OUTPATIENT)
Dept: RADIATION ONCOLOGY | Facility: CLINIC | Age: 59
End: 2019-03-15
Attending: RADIOLOGY
Payer: COMMERCIAL

## 2019-03-15 ENCOUNTER — ANCILLARY PROCEDURE (OUTPATIENT)
Dept: MRI IMAGING | Facility: CLINIC | Age: 59
End: 2019-03-15
Attending: RADIOLOGY
Payer: COMMERCIAL

## 2019-03-15 DIAGNOSIS — F40.240 CLAUSTROPHOBIA: Primary | ICD-10-CM

## 2019-03-15 PROCEDURE — 77386 ZZH IMRT TREATMENT DELIVERY, COMPLEX: CPT | Performed by: RADIOLOGY

## 2019-03-15 PROCEDURE — 77336 RADIATION PHYSICS CONSULT: CPT | Performed by: RADIOLOGY

## 2019-03-15 RX ORDER — GADOBUTROL 604.72 MG/ML
15 INJECTION INTRAVENOUS ONCE
Status: COMPLETED | OUTPATIENT
Start: 2019-03-15 | End: 2019-03-15

## 2019-03-15 RX ORDER — DIAZEPAM 10 MG
TABLET ORAL
Qty: 2 TABLET | Refills: 0 | Status: SHIPPED | OUTPATIENT
Start: 2019-03-15 | End: 2019-04-23

## 2019-03-15 RX ADMIN — GADOBUTROL 13 ML: 604.72 INJECTION INTRAVENOUS at 19:49

## 2019-03-16 NOTE — DISCHARGE INSTRUCTIONS
MRI Contrast Discharge Instructions    The IV contrast you received today will pass out of your body in your  urine. This will happen in the next 24 hours. You will not feel this process.  Your urine will not change color.    Drink at least 4 extra glasses of water or juice today (unless your doctor  has restricted your fluids). This reduces the stress on your kidneys.  You may take your regular medicines.    If you are on dialysis: It is best to have dialysis today.    If you have a reaction: Most reactions happen right away. If you have  any new symptoms after leaving the hospital (such as hives or swelling),  call your hospital at the correct number below. Or call your family doctor.  If you have breathing distress or wheezing, call 911.    Special instructions: ***    I have read and understand the above information.    Signature:______________________________________ Date:___________    Staff:__________________________________________ Date:___________     Time:__________    [place] [facility] Departments:    ___[place]: [phone]  ___[facility]: [phone]  ___[place]: [phone] ___[facility]: [phone]  ___[facility]: [phone]  ___[facility]: [phone]  ___[phone]  ___[facility]: [phone]  ___[phone]

## 2019-03-17 NOTE — ANESTHESIA PREPROCEDURE EVALUATION
Anesthesia Pre-Procedure Evaluation    Patient: Lilli Locke   MRN:     [id number] Gender:   female   Age:    58 year old :      1960        Preoperative Diagnosis: Exam under anesthesia, insertion of interstitial needles        Past Medical History:   Diagnosis Date     Antiplatelet or antithrombotic long-term use     on Xarelto since  for unprovoked PE     Complication of anesthesia 2013    PONV following hysterectomy     Diabetes mellitus type 2, insulin dependent (H)     on insulin & metformin     Diabetic neuropathy, type II diabetes mellitus (H)     hands & feet     Dyspnea on exertion      Endometrial cancer (H)      GERD (gastroesophageal reflux disease)     on omeprazole     Hx of pulmonary embolus      Mixed hyperlipidemia      Obesity     BMI 55     Obstructive sleep apnea     has CPAP     PONV (postoperative nausea and vomiting)      Substance abuse (H)      Uncomplicated asthma     Uses albuterol inhaler 1-2x/yr mostly during summer months      Past Surgical History:   Procedure Laterality Date      SECTION      x2     DENTAL SURGERY      wisdom teeth extraction     HYSTERECTOMY      cancer, ovaries removed also ([facility])     HYSTERECTOMY TOTAL ABDOMINAL, BILATERAL SALPINGO-OOPHORECTOMY, COMBINED      cancer     TUBAL LIGATION            Anesthesia Evaluation     . Pt has had prior anesthetic. Type: General and MAC    History of anesthetic complications   - PONV  PONV following hysterectomy; tolerated MAC w/ colonscopy       ROS/MED HX    ENT/Pulmonary: Comment: Uses albuterol inhaler approx. 1-2x/year, more during summer months    (+)sleep apnea, Intermittent asthma Last exacerbation: 3/4/19,Treatment: Inhaler prn,  uses CPAP , . .    Neurologic: Comment: Neuropathy in hands & feet - on gabapentin    (+)neuropathy - diabetic neuropathy, hands & feet, on gabapentin,     Cardiovascular:     (+) Dyslipidemia, ----. Taking blood thinners Pt has not received  instructions: Instructions Given to patient: stop Xarelto 72 hrs prior to surgery. . LANDRY, . :. . Previous cardiac testing Echodate:4/19/13results:Stress Testdate:9/8/00 results:ECG reviewed date:2/5/15 results: date: results:          METS/Exercise Tolerance: Comment: Unable to walk 1 block w/o stopping, avoids stairs, able to dust & do light housework, denies CP/angina 1 - Eating, dressing   Hematologic: Comments: Had unprovoked PE, started coumadin 2013, switched to xarelto after hysterectomy    (+) History of blood clots pt is anticoagulated, -      Musculoskeletal:  - neg musculoskeletal ROS       GI/Hepatic: Comment: On omeprazole    (+) GERD Asymptomatic on medication,      (-) liver disease   Renal/Genitourinary:  - ROS Renal section negative   (+) Pt has no history of transplant,    (-) renal disease   Endo: Comment: Takes daily insulin & metformin    (+) type II DM Last HgA1c: 9.5 date: 11/5/18 Using insulin - not using insulin pump Normal glucose range: 108-141 per pt report not previously admitted for DM/DKA Diabetic complications: neuropathy, Obesity, .      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 Rate: Normal     Resp. Effort: Normal      CV: Rhythm: Regular  Rate: Age appropriate  Heart: Normal Sounds   Comments:      Dental:  Dental Comments: Multiple posterior chipped teeth bilaterally                Lab Results   Component Value Date     03/07/2019    POTASSIUM 3.5 03/07/2019     "CHLORIDE 105 03/07/2019    CO2 28 03/07/2019    BUN 6 (L) 03/07/2019    CR 1.04 03/07/2019     (H) 03/07/2019    NEFTALI 8.7 03/07/2019    INR 1.37 (H) 03/07/2019       Preop Vitals  BP Readings from Last 3 Encounters:   03/11/19 146/77   03/07/19 142/61   03/04/19 120/75    Pulse Readings from Last 3 Encounters:   03/11/19 72   03/07/19 67   03/04/19 57      Resp Readings from Last 3 Encounters:   03/07/19 18    SpO2 Readings from Last 3 Encounters:   03/07/19 97%   02/18/19 94%   02/12/19 96%      Temp Readings from Last 1 Encounters:   03/07/19 36.7  C (98.1  F) (Oral)    Ht Readings from Last 1 Encounters:   03/07/19 1.626 m (5' 4\")      Wt Readings from Last 1 Encounters:   03/11/19 133.7 kg (294 lb 12.8 oz)    Estimated body mass index is 50.6 kg/m  as calculated from the following:    Height as of 3/7/19: 1.626 m (5' 4\").    Weight as of 3/11/19: 133.7 kg (294 lb 12.8 oz).     LDA:            Assessment:   ASA SCORE: 3       Documentation: H&P complete; Preop Testing complete; Consents complete   Proceeding: Proceed without further delay  Tobacco Use:  NO Active use of Tobacco/UNKNOWN Tobacco use status     Plan:   Anes. Type:  General                          PONV Management:Adult Risk Factors: Female     CONSENT: Direct conversation   Plan and risks discussed with: Patient   Blood Products: Consented (ALL Blood Products)       Comments for Plan/Consent:  Airway exam:   MP: II  MO: >3 FB  TM: > 3 FB  ROM: Full  Impression: Potentially challenging, will have videolaryngoscope available    General with ETT. PIVx1. Standard ASA monitors. IV opioids, IV and PO adjuncts as appropriate, lumbar epidural (catheter). IV antiemetics. PACU postop.    General anesthetic risks discussed included sore throat, voice hoarseness, injury to vocal cords, throat, mouth, teeth, tongue, and lips from intubation; nausea/vomiting; cardiac arrest, respiratory complications, MI, stroke, blood clots, death.      Presented " opportunity to answer patient/family/POA/guardian questions. Questions addressed.      ###Note may not be generated accurately due to auto-population of certain parts of the note upon opening for editing###                      PAC Discussion and Assessment    ASA Classification: 3  Case is suitable for: [place]  Anesthetic techniques and relevant risks discussed: GA with regional block for post-op pain control  Invasive monitoring and risk discussed: No  Types:   Possibility and Risk of blood transfusion discussed: No  NPO instructions given:   Additional anesthetic preparation and risks discussed:   Needs early admission to pre-op area:   Other:     PAC Resident/NP Anesthesia Assessment:  Lilli Locke is a 58 year old female scheduled to undergo insertion of interstitial needles with Laparoscopic guidance and Ultrasound guidance with Elsy Elizabeth MD on 3/18/19 at [facility] for treatment of endometrial cancer. The needles are scheduled to be removed on 3/20/19.   She has the following specific operative considerations:   - RCRI : No serious cardiac risks.  0.4% risk of major adverse cardiac event.   - Anesthesia considerations:  Refer to PAC assessment in anesthesia records  - VTE risk: 4.5% (BMI >40, hx VTE, current cancer)  - QUINTIN # of risks - uses CPAP for QUINTIN  - Risk of PONV score = 4.  If > 2, anti-emetic intervention recommended.    Pt has had prior anesthetic. Type: General and MAC    History of anesthetic complications   - PONV  PONV following hysterectomy; tolerated MAC w/ colonscopy 2018    1) Cardiac: METS 1, unable to walk 1 block w/o stopping, avoids stairs, denies CP/angina (see prior studies above)  2) Pulmonary: + QUINTIN, uses CPAP; non-smoker  3) GI: GERD well managed on omeprazole, pt instructed to take med evening prior to surgery  4) : active vaginal cuff cancer, renal function WNL  5) Endo: BMI 55; Diabetes type 2, on daily insulin &  metformin, A1c 9.0 (down from 9.5 on 11/5/18)  6) Heme: hx unprovoked PE, on Xarelto since 2013, will stop 72 hours before surgery  7) Ortho: Full/limited Neck ROM, thick neck    * Increased risk for pressure ulcers, consider mattress choice   * Increased risk for DVT/PE    Patient was discussed with Dr Castillo. Dr Elizabeth staff messaged regarding elevated A1c.        Reviewed and Signed by PAC Mid-Level Provider/Resident  Mid-Level Provider/Resident: Vijaya Baugh PA-C  Date: 3/7/19  Time: 1441    Attending Anesthesiologist Anesthesia Assessment:  I have examined the patient and reviewed the medical record.  I have discussed the patient with the CAITLIN and concur with her assessment  The patient is scheduled for placement of brachytherapy needles for endometrial CA  She has history of QUINTIN  She has history of GERD controlled with omeprazole ( but without it feels bile in throat)  She has poorly controlled IDDM (HgbA1c is 9.5)  She had unprovoked PE over 5 years ago and is on xarelto    PE:  Obesem, pleasant female in [place].  MPC 2-3, thick neck.  Lungs clear  CV  RRR without murmur    Will have her hold xarelto for 72 hours per MATTHEW guidelines  Have her take omeprazole as scheduled without missing dose  TO take albuterol prior to surgery  Contacted primary care about poor DM control but no indication to cancel for this surgery  Final plan per attending anesthesiologist the day of surgery.      Reviewed and Signed by PAC Anesthesiologist  Anesthesiologist: Nathaniel Castillo MD  Date:   Time:   Pass/Fail:   Disposition:     PAC Pharmacist Assessment:        Pharmacist:   Date:   Time:        Quinn Hernandez MD

## 2019-03-18 ENCOUNTER — APPOINTMENT (OUTPATIENT)
Dept: ULTRASOUND IMAGING | Facility: CLINIC | Age: 59
DRG: 744 | End: 2019-03-18
Attending: RADIOLOGY
Payer: COMMERCIAL

## 2019-03-18 ENCOUNTER — APPOINTMENT (OUTPATIENT)
Dept: RADIATION ONCOLOGY | Facility: CLINIC | Age: 59
End: 2019-03-18
Attending: RADIOLOGY
Payer: COMMERCIAL

## 2019-03-18 ENCOUNTER — SURGERY (OUTPATIENT)
Age: 59
End: 2019-03-18
Payer: COMMERCIAL

## 2019-03-18 ENCOUNTER — ANESTHESIA (OUTPATIENT)
Dept: SURGERY | Facility: CLINIC | Age: 59
DRG: 744 | End: 2019-03-18
Payer: COMMERCIAL

## 2019-03-18 ENCOUNTER — HOSPITAL ENCOUNTER (INPATIENT)
Facility: CLINIC | Age: 59
LOS: 3 days | Discharge: HOME | DRG: 744 | End: 2019-03-21
Attending: RADIOLOGY | Admitting: OBSTETRICS & GYNECOLOGY
Payer: COMMERCIAL

## 2019-03-18 DIAGNOSIS — C54.1 ENDOMETRIAL CANCER (H): Primary | ICD-10-CM

## 2019-03-18 DIAGNOSIS — R52 PAIN: ICD-10-CM

## 2019-03-18 LAB
CREAT SERPL-MCNC: 0.96 MG/DL (ref 0.52–1.04)
ERYTHROCYTE [DISTWIDTH] IN BLOOD BY AUTOMATED COUNT: 14.9 % (ref 10–15)
GFR SERPL CREATININE-BSD FRML MDRD: 65 ML/MIN/{1.73_M2}
GLUCOSE BLDC GLUCOMTR-MCNC: 114 MG/DL (ref 70–99)
GLUCOSE BLDC GLUCOMTR-MCNC: 182 MG/DL (ref 70–99)
GLUCOSE BLDC GLUCOMTR-MCNC: 198 MG/DL (ref 70–99)
GLUCOSE BLDC GLUCOMTR-MCNC: 216 MG/DL (ref 70–99)
GLUCOSE BLDC GLUCOMTR-MCNC: 96 MG/DL (ref 70–99)
HCT VFR BLD AUTO: 38.6 % (ref 35–47)
HGB BLD-MCNC: 11.4 G/DL (ref 11.7–15.7)
HGB BLD-MCNC: 11.8 G/DL (ref 11.7–15.7)
HGB BLD-MCNC: 11.9 G/DL (ref 11.7–15.7)
INR PPP: 0.99 (ref 0.86–1.14)
MCH RBC QN AUTO: 29.6 PG (ref 26.5–33)
MCHC RBC AUTO-ENTMCNC: 30.6 G/DL (ref 31.5–36.5)
MCV RBC AUTO: 97 FL (ref 78–100)
PLATELET # BLD AUTO: 196 10E9/L (ref 150–450)
POTASSIUM SERPL-SCNC: 3.5 MMOL/L (ref 3.4–5.3)
RBC # BLD AUTO: 3.99 10E12/L (ref 3.8–5.2)
WBC # BLD AUTO: 5.6 10E9/L (ref 4–11)

## 2019-03-18 PROCEDURE — 37000008 ZZH ANESTHESIA TECHNICAL FEE, 1ST 30 MIN: Performed by: RADIOLOGY

## 2019-03-18 PROCEDURE — 12000001 ZZH R&B MED SURG/OB UMMC

## 2019-03-18 PROCEDURE — 82962 GLUCOSE BLOOD TEST: CPT

## 2019-03-18 PROCEDURE — 25000132 ZZH RX MED GY IP 250 OP 250 PS 637

## 2019-03-18 PROCEDURE — 76857 US EXAM PELVIC LIMITED: CPT

## 2019-03-18 PROCEDURE — 0WJJ4ZZ INSPECTION OF PELVIC CAVITY, PERCUTANEOUS ENDOSCOPIC APPROACH: ICD-10-PCS | Performed by: OBSTETRICS & GYNECOLOGY

## 2019-03-18 PROCEDURE — 55920 PLACE NEEDLES PELVIC FOR RT: CPT | Performed by: RADIOLOGY

## 2019-03-18 PROCEDURE — 77332 RADIATION TREATMENT AID(S): CPT | Performed by: RADIOLOGY

## 2019-03-18 PROCEDURE — 77470 SPECIAL RADIATION TREATMENT: CPT | Performed by: RADIOLOGY

## 2019-03-18 PROCEDURE — 71000013 ZZH RECOVERY PHASE 1 LEVEL 1 EA ADDTL HR: Performed by: RADIOLOGY

## 2019-03-18 PROCEDURE — 77295 3-D RADIOTHERAPY PLAN: CPT | Performed by: RADIOLOGY

## 2019-03-18 PROCEDURE — 77772 HDR RDNCL NTRSTL/ICAV BRCHTX: CPT | Performed by: RADIOLOGY

## 2019-03-18 PROCEDURE — 00000146 ZZHCL STATISTIC GLUCOSE BY METER IP

## 2019-03-18 PROCEDURE — 25000125 ZZHC RX 250

## 2019-03-18 PROCEDURE — 25000131 ZZH RX MED GY IP 250 OP 636 PS 637: Performed by: OBSTETRICS & GYNECOLOGY

## 2019-03-18 PROCEDURE — DW169YZ HIGH DOSE RATE (HDR) BRACHYTHERAPY OF PELVIC REGION USING OTHER ISOTOPE: ICD-10-PCS | Performed by: RADIOLOGY

## 2019-03-18 PROCEDURE — 25000128 H RX IP 250 OP 636

## 2019-03-18 PROCEDURE — 82565 ASSAY OF CREATININE: CPT | Performed by: ANESTHESIOLOGY

## 2019-03-18 PROCEDURE — 37000009 ZZH ANESTHESIA TECHNICAL FEE, EACH ADDTL 15 MIN: Performed by: RADIOLOGY

## 2019-03-18 PROCEDURE — 36000057 ZZH SURGERY LEVEL 3 1ST 30 MIN - UMMC: Performed by: RADIOLOGY

## 2019-03-18 PROCEDURE — 71000012 ZZH RECOVERY PHASE 1 LEVEL 1 FIRST HR: Performed by: RADIOLOGY

## 2019-03-18 PROCEDURE — 85610 PROTHROMBIN TIME: CPT | Performed by: ANESTHESIOLOGY

## 2019-03-18 PROCEDURE — 40000171 ZZH STATISTIC PRE-PROCEDURE ASSESSMENT III: Performed by: RADIOLOGY

## 2019-03-18 PROCEDURE — 85018 HEMOGLOBIN: CPT | Performed by: ANESTHESIOLOGY

## 2019-03-18 PROCEDURE — 25000132 ZZH RX MED GY IP 250 OP 250 PS 637: Performed by: NURSE PRACTITIONER

## 2019-03-18 PROCEDURE — 25800030 ZZH RX IP 258 OP 636: Performed by: STUDENT IN AN ORGANIZED HEALTH CARE EDUCATION/TRAINING PROGRAM

## 2019-03-18 PROCEDURE — 36415 COLL VENOUS BLD VENIPUNCTURE: CPT

## 2019-03-18 PROCEDURE — 36000059 ZZH SURGERY LEVEL 3 EA 15 ADDTL MIN UMMC: Performed by: RADIOLOGY

## 2019-03-18 PROCEDURE — 25000566 ZZH SEVOFLURANE, EA 15 MIN: Performed by: RADIOLOGY

## 2019-03-18 PROCEDURE — 25800030 ZZH RX IP 258 OP 636: Performed by: ANESTHESIOLOGY

## 2019-03-18 PROCEDURE — 0UHG71Z INSERTION OF RADIOACTIVE ELEMENT INTO VAGINA, VIA NATURAL OR ARTIFICIAL OPENING: ICD-10-PCS | Performed by: RADIOLOGY

## 2019-03-18 PROCEDURE — 25800025 ZZH RX 258

## 2019-03-18 PROCEDURE — 36415 COLL VENOUS BLD VENIPUNCTURE: CPT | Performed by: ANESTHESIOLOGY

## 2019-03-18 PROCEDURE — 25000128 H RX IP 250 OP 636: Performed by: STUDENT IN AN ORGANIZED HEALTH CARE EDUCATION/TRAINING PROGRAM

## 2019-03-18 PROCEDURE — 85027 COMPLETE CBC AUTOMATED: CPT

## 2019-03-18 PROCEDURE — 27210794 ZZH OR GENERAL SUPPLY STERILE: Performed by: RADIOLOGY

## 2019-03-18 PROCEDURE — 25000132 ZZH RX MED GY IP 250 OP 250 PS 637: Performed by: RADIOLOGY

## 2019-03-18 PROCEDURE — 84132 ASSAY OF SERUM POTASSIUM: CPT | Performed by: ANESTHESIOLOGY

## 2019-03-18 PROCEDURE — 49320 DIAG LAPARO SEPARATE PROC: CPT | Mod: GC | Performed by: OBSTETRICS & GYNECOLOGY

## 2019-03-18 PROCEDURE — C1717 BRACHYTX, NON-STR,HDR IR-192: HCPCS | Performed by: RADIOLOGY

## 2019-03-18 PROCEDURE — 25000131 ZZH RX MED GY IP 250 OP 636 PS 637: Performed by: NURSE PRACTITIONER

## 2019-03-18 PROCEDURE — 85018 HEMOGLOBIN: CPT

## 2019-03-18 PROCEDURE — 25000128 H RX IP 250 OP 636: Performed by: ANESTHESIOLOGY

## 2019-03-18 RX ORDER — ALBUTEROL SULFATE 0.83 MG/ML
2.5 SOLUTION RESPIRATORY (INHALATION)
Status: DISCONTINUED | OUTPATIENT
Start: 2019-03-18 | End: 2019-03-21 | Stop reason: HOSPADM

## 2019-03-18 RX ORDER — ACETAMINOPHEN 325 MG/1
650 TABLET ORAL EVERY 4 HOURS PRN
Status: DISCONTINUED | OUTPATIENT
Start: 2019-03-21 | End: 2019-03-21 | Stop reason: HOSPADM

## 2019-03-18 RX ORDER — PHENAZOPYRIDINE HYDROCHLORIDE 200 MG/1
200 TABLET, FILM COATED ORAL ONCE
Status: COMPLETED | OUTPATIENT
Start: 2019-03-18 | End: 2019-03-18

## 2019-03-18 RX ORDER — ALBUTEROL SULFATE 90 UG/1
1-2 AEROSOL, METERED RESPIRATORY (INHALATION)
Status: DISCONTINUED | OUTPATIENT
Start: 2019-03-18 | End: 2019-03-21 | Stop reason: HOSPADM

## 2019-03-18 RX ORDER — FLUMAZENIL 0.1 MG/ML
0.2 INJECTION, SOLUTION INTRAVENOUS
Status: DISCONTINUED | OUTPATIENT
Start: 2019-03-18 | End: 2019-03-18 | Stop reason: HOSPADM

## 2019-03-18 RX ORDER — FENTANYL CITRATE 50 UG/ML
25-50 INJECTION, SOLUTION INTRAMUSCULAR; INTRAVENOUS
Status: DISCONTINUED | OUTPATIENT
Start: 2019-03-18 | End: 2019-03-18 | Stop reason: HOSPADM

## 2019-03-18 RX ORDER — SODIUM CHLORIDE, SODIUM LACTATE, POTASSIUM CHLORIDE, CALCIUM CHLORIDE 600; 310; 30; 20 MG/100ML; MG/100ML; MG/100ML; MG/100ML
INJECTION, SOLUTION INTRAVENOUS CONTINUOUS
Status: DISCONTINUED | OUTPATIENT
Start: 2019-03-18 | End: 2019-03-18 | Stop reason: HOSPADM

## 2019-03-18 RX ORDER — NALOXONE HYDROCHLORIDE 0.4 MG/ML
.1-.4 INJECTION, SOLUTION INTRAMUSCULAR; INTRAVENOUS; SUBCUTANEOUS
Status: DISCONTINUED | OUTPATIENT
Start: 2019-03-18 | End: 2019-03-19

## 2019-03-18 RX ORDER — FLUTICASONE PROPIONATE 50 MCG
2 SPRAY, SUSPENSION (ML) NASAL AT BEDTIME
Status: DISCONTINUED | OUTPATIENT
Start: 2019-03-18 | End: 2019-03-21 | Stop reason: HOSPADM

## 2019-03-18 RX ORDER — ATORVASTATIN CALCIUM 40 MG/1
40 TABLET, FILM COATED ORAL AT BEDTIME
Status: DISCONTINUED | OUTPATIENT
Start: 2019-03-18 | End: 2019-03-21 | Stop reason: HOSPADM

## 2019-03-18 RX ORDER — GLYCOPYRROLATE 0.2 MG/ML
INJECTION, SOLUTION INTRAMUSCULAR; INTRAVENOUS PRN
Status: DISCONTINUED | OUTPATIENT
Start: 2019-03-18 | End: 2019-03-18

## 2019-03-18 RX ORDER — LABETALOL 20 MG/4 ML (5 MG/ML) INTRAVENOUS SYRINGE
10
Status: DISCONTINUED | OUTPATIENT
Start: 2019-03-18 | End: 2019-03-18 | Stop reason: HOSPADM

## 2019-03-18 RX ORDER — NALOXONE HYDROCHLORIDE 0.4 MG/ML
.1-.4 INJECTION, SOLUTION INTRAMUSCULAR; INTRAVENOUS; SUBCUTANEOUS
Status: DISCONTINUED | OUTPATIENT
Start: 2019-03-18 | End: 2019-03-21 | Stop reason: HOSPADM

## 2019-03-18 RX ORDER — INSULIN LISPRO 100 [IU]/ML
45 INJECTION, SUSPENSION SUBCUTANEOUS
Status: DISCONTINUED | OUTPATIENT
Start: 2019-03-19 | End: 2019-03-18

## 2019-03-18 RX ORDER — NALBUPHINE HYDROCHLORIDE 10 MG/ML
2.5-5 INJECTION, SOLUTION INTRAMUSCULAR; INTRAVENOUS; SUBCUTANEOUS EVERY 6 HOURS PRN
Status: DISCONTINUED | OUTPATIENT
Start: 2019-03-18 | End: 2019-03-18

## 2019-03-18 RX ORDER — NALOXONE HYDROCHLORIDE 0.4 MG/ML
.1-.4 INJECTION, SOLUTION INTRAMUSCULAR; INTRAVENOUS; SUBCUTANEOUS
Status: DISCONTINUED | OUTPATIENT
Start: 2019-03-18 | End: 2019-03-18 | Stop reason: HOSPADM

## 2019-03-18 RX ORDER — NALOXONE HYDROCHLORIDE 0.4 MG/ML
.1-.4 INJECTION, SOLUTION INTRAMUSCULAR; INTRAVENOUS; SUBCUTANEOUS
Status: DISCONTINUED | OUTPATIENT
Start: 2019-03-18 | End: 2019-03-18

## 2019-03-18 RX ORDER — ONDANSETRON 2 MG/ML
4 INJECTION INTRAMUSCULAR; INTRAVENOUS EVERY 30 MIN PRN
Status: DISCONTINUED | OUTPATIENT
Start: 2019-03-18 | End: 2019-03-18 | Stop reason: HOSPADM

## 2019-03-18 RX ORDER — ONDANSETRON 4 MG/1
4 TABLET, ORALLY DISINTEGRATING ORAL EVERY 30 MIN PRN
Status: DISCONTINUED | OUTPATIENT
Start: 2019-03-18 | End: 2019-03-18 | Stop reason: HOSPADM

## 2019-03-18 RX ORDER — DEXTROSE MONOHYDRATE 25 G/50ML
25-50 INJECTION, SOLUTION INTRAVENOUS
Status: DISCONTINUED | OUTPATIENT
Start: 2019-03-18 | End: 2019-03-21 | Stop reason: HOSPADM

## 2019-03-18 RX ORDER — DEXAMETHASONE SODIUM PHOSPHATE 4 MG/ML
INJECTION, SOLUTION INTRA-ARTICULAR; INTRALESIONAL; INTRAMUSCULAR; INTRAVENOUS; SOFT TISSUE PRN
Status: DISCONTINUED | OUTPATIENT
Start: 2019-03-18 | End: 2019-03-18

## 2019-03-18 RX ORDER — DIPHENOXYLATE HCL/ATROPINE 2.5-.025MG
1-2 TABLET ORAL 4 TIMES DAILY PRN
Status: DISCONTINUED | OUTPATIENT
Start: 2019-03-18 | End: 2019-03-21 | Stop reason: HOSPADM

## 2019-03-18 RX ORDER — NICOTINE POLACRILEX 4 MG
15-30 LOZENGE BUCCAL
Status: DISCONTINUED | OUTPATIENT
Start: 2019-03-18 | End: 2019-03-18

## 2019-03-18 RX ORDER — PROPOFOL 10 MG/ML
INJECTION, EMULSION INTRAVENOUS CONTINUOUS PRN
Status: DISCONTINUED | OUTPATIENT
Start: 2019-03-18 | End: 2019-03-18

## 2019-03-18 RX ORDER — DIPHENOXYLATE HCL/ATROPINE 2.5-.025MG
2 TABLET ORAL 4 TIMES DAILY PRN
Status: DISCONTINUED | OUTPATIENT
Start: 2019-03-18 | End: 2019-03-18

## 2019-03-18 RX ORDER — NICOTINE POLACRILEX 4 MG
15-30 LOZENGE BUCCAL
Status: DISCONTINUED | OUTPATIENT
Start: 2019-03-18 | End: 2019-03-21 | Stop reason: HOSPADM

## 2019-03-18 RX ORDER — LIDOCAINE HYDROCHLORIDE 20 MG/ML
INJECTION, SOLUTION INFILTRATION; PERINEURAL PRN
Status: DISCONTINUED | OUTPATIENT
Start: 2019-03-18 | End: 2019-03-18

## 2019-03-18 RX ORDER — INSULIN LISPRO 100 [IU]/ML
35 INJECTION, SUSPENSION SUBCUTANEOUS EVERY EVENING
Status: DISCONTINUED | OUTPATIENT
Start: 2019-03-18 | End: 2019-03-18

## 2019-03-18 RX ORDER — OXYCODONE HYDROCHLORIDE 5 MG/1
5-10 TABLET ORAL
Status: DISCONTINUED | OUTPATIENT
Start: 2019-03-18 | End: 2019-03-21 | Stop reason: HOSPADM

## 2019-03-18 RX ORDER — FENTANYL CITRATE 50 UG/ML
INJECTION, SOLUTION INTRAMUSCULAR; INTRAVENOUS PRN
Status: DISCONTINUED | OUTPATIENT
Start: 2019-03-18 | End: 2019-03-18

## 2019-03-18 RX ORDER — ACETAMINOPHEN 325 MG/1
975 TABLET ORAL EVERY 8 HOURS
Status: DISCONTINUED | OUTPATIENT
Start: 2019-03-18 | End: 2019-03-21 | Stop reason: HOSPADM

## 2019-03-18 RX ORDER — HYDRALAZINE HYDROCHLORIDE 20 MG/ML
2.5-5 INJECTION INTRAMUSCULAR; INTRAVENOUS EVERY 10 MIN PRN
Status: DISCONTINUED | OUTPATIENT
Start: 2019-03-18 | End: 2019-03-18 | Stop reason: HOSPADM

## 2019-03-18 RX ORDER — HYDROMORPHONE HYDROCHLORIDE 1 MG/ML
.3-.5 INJECTION, SOLUTION INTRAMUSCULAR; INTRAVENOUS; SUBCUTANEOUS EVERY 5 MIN PRN
Status: DISCONTINUED | OUTPATIENT
Start: 2019-03-18 | End: 2019-03-18 | Stop reason: HOSPADM

## 2019-03-18 RX ORDER — DEXTROSE MONOHYDRATE 25 G/50ML
25-50 INJECTION, SOLUTION INTRAVENOUS
Status: DISCONTINUED | OUTPATIENT
Start: 2019-03-18 | End: 2019-03-18

## 2019-03-18 RX ORDER — ONDANSETRON 2 MG/ML
INJECTION INTRAMUSCULAR; INTRAVENOUS PRN
Status: DISCONTINUED | OUTPATIENT
Start: 2019-03-18 | End: 2019-03-18

## 2019-03-18 RX ORDER — GABAPENTIN 300 MG/1
300 CAPSULE ORAL 2 TIMES DAILY
Status: DISCONTINUED | OUTPATIENT
Start: 2019-03-18 | End: 2019-03-21 | Stop reason: HOSPADM

## 2019-03-18 RX ORDER — IPRATROPIUM BROMIDE AND ALBUTEROL SULFATE 2.5; .5 MG/3ML; MG/3ML
3 SOLUTION RESPIRATORY (INHALATION) ONCE
Status: COMPLETED | OUTPATIENT
Start: 2019-03-18 | End: 2019-03-18

## 2019-03-18 RX ORDER — PROPOFOL 10 MG/ML
INJECTION, EMULSION INTRAVENOUS PRN
Status: DISCONTINUED | OUTPATIENT
Start: 2019-03-18 | End: 2019-03-18

## 2019-03-18 RX ADMIN — PHENAZOPYRIDINE HYDROCHLORIDE 200 MG: 200 TABLET, FILM COATED ORAL at 06:25

## 2019-03-18 RX ADMIN — GABAPENTIN 300 MG: 300 CAPSULE ORAL at 19:41

## 2019-03-18 RX ADMIN — OXYCODONE HYDROCHLORIDE 10 MG: 5 TABLET ORAL at 21:10

## 2019-03-18 RX ADMIN — OXYCODONE HYDROCHLORIDE 5 MG: 5 TABLET ORAL at 15:04

## 2019-03-18 RX ADMIN — SODIUM CHLORIDE, POTASSIUM CHLORIDE, SODIUM LACTATE AND CALCIUM CHLORIDE: 600; 310; 30; 20 INJECTION, SOLUTION INTRAVENOUS at 07:34

## 2019-03-18 RX ADMIN — ROCURONIUM BROMIDE 10 MG: 10 INJECTION INTRAVENOUS at 08:32

## 2019-03-18 RX ADMIN — ONDANSETRON 4 MG: 2 INJECTION INTRAMUSCULAR; INTRAVENOUS at 09:24

## 2019-03-18 RX ADMIN — Medication: at 11:22

## 2019-03-18 RX ADMIN — FENTANYL CITRATE 100 MCG: 50 INJECTION, SOLUTION INTRAMUSCULAR; INTRAVENOUS at 07:44

## 2019-03-18 RX ADMIN — ATORVASTATIN CALCIUM 40 MG: 40 TABLET, FILM COATED ORAL at 21:10

## 2019-03-18 RX ADMIN — FLUOXETINE 40 MG: 20 CAPSULE ORAL at 21:10

## 2019-03-18 RX ADMIN — MIDAZOLAM 1 MG: 1 INJECTION INTRAMUSCULAR; INTRAVENOUS at 06:57

## 2019-03-18 RX ADMIN — FENTANYL CITRATE 25 MCG: 50 INJECTION INTRAMUSCULAR; INTRAVENOUS at 10:53

## 2019-03-18 RX ADMIN — DEXTROSE AND SODIUM CHLORIDE: 5; 450 INJECTION, SOLUTION INTRAVENOUS at 11:15

## 2019-03-18 RX ADMIN — GLYCOPYRROLATE 0.2 MG: 0.2 INJECTION, SOLUTION INTRAMUSCULAR; INTRAVENOUS at 08:41

## 2019-03-18 RX ADMIN — FLUTICASONE PROPIONATE 2 SPRAY: 50 SPRAY, METERED NASAL at 21:11

## 2019-03-18 RX ADMIN — FENTANYL CITRATE 25 MCG: 50 INJECTION INTRAMUSCULAR; INTRAVENOUS at 10:06

## 2019-03-18 RX ADMIN — ACETAMINOPHEN 975 MG: 325 TABLET, FILM COATED ORAL at 15:04

## 2019-03-18 RX ADMIN — FENTANYL CITRATE 25 MCG: 50 INJECTION INTRAMUSCULAR; INTRAVENOUS at 10:18

## 2019-03-18 RX ADMIN — INSULIN ASPART 35 UNITS: 100 INJECTION, SUSPENSION SUBCUTANEOUS at 21:11

## 2019-03-18 RX ADMIN — ROCURONIUM BROMIDE 10 MG: 10 INJECTION INTRAVENOUS at 08:51

## 2019-03-18 RX ADMIN — INSULIN ASPART 1 UNITS: 100 INJECTION, SOLUTION INTRAVENOUS; SUBCUTANEOUS at 19:42

## 2019-03-18 RX ADMIN — PROPOFOL 50 MG: 10 INJECTION, EMULSION INTRAVENOUS at 09:18

## 2019-03-18 RX ADMIN — OXYCODONE HYDROCHLORIDE 5 MG: 5 TABLET ORAL at 17:12

## 2019-03-18 RX ADMIN — FENTANYL CITRATE 50 MCG: 50 INJECTION INTRAMUSCULAR; INTRAVENOUS at 06:57

## 2019-03-18 RX ADMIN — PROPOFOL 150 MG: 10 INJECTION, EMULSION INTRAVENOUS at 07:44

## 2019-03-18 RX ADMIN — ROCURONIUM BROMIDE 60 MG: 10 INJECTION INTRAVENOUS at 07:44

## 2019-03-18 RX ADMIN — IPRATROPIUM BROMIDE AND ALBUTEROL SULFATE 3 ML: .5; 3 SOLUTION RESPIRATORY (INHALATION) at 07:15

## 2019-03-18 RX ADMIN — Medication 0.5 MG: at 13:43

## 2019-03-18 RX ADMIN — DEXAMETHASONE SODIUM PHOSPHATE 8 MG: 4 INJECTION, SOLUTION INTRA-ARTICULAR; INTRALESIONAL; INTRAMUSCULAR; INTRAVENOUS; SOFT TISSUE at 08:24

## 2019-03-18 RX ADMIN — Medication 0.3 MG: at 11:46

## 2019-03-18 RX ADMIN — BUPIVACAINE HYDROCHLORIDE 8 ML/HR: 7.5 INJECTION, SOLUTION EPIDURAL; RETROBULBAR at 08:58

## 2019-03-18 RX ADMIN — LIDOCAINE HYDROCHLORIDE 100 MG: 20 INJECTION, SOLUTION INFILTRATION; PERINEURAL at 07:44

## 2019-03-18 RX ADMIN — PROPOFOL 120 MCG/KG/MIN: 10 INJECTION, EMULSION INTRAVENOUS at 07:44

## 2019-03-18 RX ADMIN — OMEPRAZOLE 20 MG: 20 CAPSULE, DELAYED RELEASE ORAL at 21:11

## 2019-03-18 RX ADMIN — ROCURONIUM BROMIDE 10 MG: 10 INJECTION INTRAVENOUS at 09:18

## 2019-03-18 ASSESSMENT — PAIN DESCRIPTION - DESCRIPTORS
DESCRIPTORS: SORE
DESCRIPTORS: SORE

## 2019-03-18 ASSESSMENT — MIFFLIN-ST. JEOR: SCORE: 1866.25

## 2019-03-18 ASSESSMENT — ACTIVITIES OF DAILY LIVING (ADL)
ADLS_ACUITY_SCORE: 13
ADLS_ACUITY_SCORE: 13

## 2019-03-18 NOTE — ANESTHESIA PROCEDURE NOTES
Epidural Procedure Note    Staff:     Anesthesiologist:  Yogi Collins MD    Resident/CRNA:  Yaneth Hodges MD    Procedure performed by resident/CRNA in the presence of a teaching physician    Location: Pre-op     Procedure start time:  3/18/2019 6:51 AM     Procedure end time:  3/18/2019 7:13 AM   Pre-procedure checklist:   patient identified, IV checked, site marked, risks and benefits discussed, informed consent, monitors and equipment checked, pre-op evaluation, at physician/surgeon's request and post-op pain management      Correct Patient: Yes      Correct Position: Yes      Correct Site: Yes      Correct Procedure: Yes      Correct Laterality:  Yes    Site Marked:  Yes  Procedure:     Procedure:  Epidural catheter    ASA:  2    Position:  Sitting    Insertion site:  L3-4    Local skin infiltration:  2% lidocaine    amount (mL):  3    Approach:  Midline    Needle gauge (G):  17    Block Needle Type:  Touhy    Injection Technique:  LORT saline    Attempts:  2    Redirects:  2    Catheter threaded easily: Yes      Threaded to cm at skin:  8.5    Threaded in epidural space (cm):  14    Paresthesias:  No    Aspiration negative for Heme or CSF: Yes       Local anesthetic:  Lidocaine 1.5% w/ 1:200,000 epinephrine    Test dose time:  07:10    Test dose negative for signs of intravascular, subdural or intrathecal injection: Yes

## 2019-03-18 NOTE — OR NURSING
Pt had epidural catheter placed pre op with out complications. Test dose given at 710, No side effects noted.

## 2019-03-18 NOTE — OR NURSING
Writer spoke to Brittany Yuen NP with gyn onc. She stated that MDs from gyn onc team are currently placing post procedure orders.

## 2019-03-18 NOTE — DISCHARGE SUMMARY
Gynecologic Oncology Discharge Summary    Lilli Locke  [id number]    Admit Date: 3/18/2019  Discharge Date: 3/21/19  Admitting Provider: Dr Huang  Discharge Provider: Dr Huang    Admission Dx:   - Recurrent endometrial cancer  - Hx Asthma  - Hx QUINTIN  - Hx Obesity  - Hx PE  - Hx HLD  - Hx DM II  - Hx GERD    Discharge Dx:  - Recurrent endometrial cancer  - Hx Asthma  - Hx QUINTIN  - Hx Obesity  - Hx PE  - Hx HLD  - Hx DM II  - Hx GERD  - Sacral Ala Fracture noted on MRI scan    Patient Active Problem List   Diagnosis     Endometrial cancer (H)     Procedures:   - Laparoscopic placement of interstitial needles  - Removal of interstitial needles    Prior to Admission Medications:  Medications Prior to Admission   Medication Sig Dispense Refill Last Dose     albuterol (PROVENTIL) (2.5 MG/3ML) 0.083% neb solution Inhale 3 mLs into the lungs as needed    3/15/2019 at 2000     albuterol (VENTOLIN HFA) 108 (90 Base) MCG/ACT inhaler Inhale 1-2 puffs into the lungs as needed   3/17/2019 at 2000     atorvastatin (LIPITOR) 40 MG tablet Take 40 mg by mouth At Bedtime    3/17/2019 at 2000     docusate sodium (COLACE) 100 MG capsule Take 100 mg by mouth as needed for constipation    Past Month at Unknown time     FLUoxetine (PROZAC) 20 MG capsule Take 40 mg by mouth At Bedtime    3/17/2019 at 2000     fluticasone (FLONASE) 50 MCG/ACT nasal spray 2 sprays At Bedtime  12 3/17/2019 at 2000     GABAPENTIN PO Take 300 mg by mouth 2 times daily  11 3/17/2019 at 2000     insulin lispro prot & lispro (HUMALOG MIX 75/25 PEN) (75-25) 100 UNIT/ML pen Inject 90 Units Subcutaneous 2 times daily 90 units in am, 70 units in pm   3/17/2019 at 2000     METFORMIN HCL PO Take 1,000 mg by mouth 2 times daily (with meals)    3/17/2019 at 2000     omeprazole (PRILOSEC) 20 MG capsule Take 20 mg by mouth At Bedtime    3/17/2019 at 2000     diazepam (VALIUM) 10 MG tablet Take 1 tablet by mouth 40 minutes before the procedure for anxiety; may repeat x  1. 2 tablet 0 3/15/2019 at 2000     diphenoxylate-atropine (LOMOTIL) 2.5-0.025 MG tablet Take 2 tablets by mouth 4 times daily as needed for diarrhea 60 tablet 0 3/15/2019 at Unknown time     sulfamethoxazole-trimethoprim (BACTRIM DS/SEPTRA DS) 800-160 MG tablet Take 1 tablet by mouth 2 times daily 6 tablet 0 3/6/2019     UNABLE TO FIND as needed MEDICATION NAME: [facility] itchy eye gtts         XARELTO 20 MG TABS tablet Take 20 mg by mouth At Bedtime  12 3/15/2019     [DISCONTINUED] ondansetron (ZOFRAN) 8 MG tablet Take 1 tablet (8 mg) by mouth every 8 hours as needed for nausea (Patient taking differently: Take 8 mg by mouth as needed for nausea ) 30 tablet 0      Discharge Medications:     Review of your medicines      START taking      Dose / Directions   acetaminophen 325 MG tablet  Commonly known as:  TYLENOL      Dose:  650 mg  Take 2 tablets (650 mg) by mouth every 4 hours as needed for other (multimodal surgical pain management along with NSAIDS and opioid medication as indicated based on pain control and physical function.)  Quantity:  100 tablet  Refills:  0        CONTINUE these medicines which may have CHANGED, or have new prescriptions. If we are uncertain of the size of tablets/capsules you have at home, strength may be listed as something that might have changed.      Dose / Directions   oxyCODONE-acetaminophen 5-325 MG tablet  Commonly known as:  PERCOCET  This may have changed:  when to take this  Used for:  Pain      Dose:  1 tablet  Take 1 tablet by mouth every 6 hours as needed for severe pain  Quantity:  5 tablet  Refills:  0        CONTINUE these medicines which have NOT CHANGED      Dose / Directions   * albuterol (2.5 MG/3ML) 0.083% neb solution  Commonly known as:  PROVENTIL      Dose:  3 mL  Inhale 3 mLs into the lungs as needed  Refills:  0     * VENTOLIN  (90 Base) MCG/ACT inhaler  Generic drug:  albuterol      Dose:  1-2 puff  Inhale 1-2 puffs into the lungs as needed  Refills:  0      atorvastatin 40 MG tablet  Commonly known as:  LIPITOR      Dose:  40 mg  Take 40 mg by mouth At Bedtime  Refills:  0     diazepam 10 MG tablet  Commonly known as:  VALIUM  Used for:  Claustrophobia      Take 1 tablet by mouth 40 minutes before the procedure for anxiety; may repeat x 1.  Quantity:  2 tablet  Refills:  0     diphenoxylate-atropine 2.5-0.025 MG tablet  Commonly known as:  LOMOTIL  Used for:  Diarrhea, unspecified type      Dose:  2 tablet  Take 2 tablets by mouth 4 times daily as needed for diarrhea  Quantity:  60 tablet  Refills:  0     docusate sodium 100 MG capsule  Commonly known as:  COLACE      Dose:  100 mg  Take 100 mg by mouth as needed for constipation  Refills:  0     FLUoxetine 20 MG capsule  Commonly known as:  PROzac      Dose:  40 mg  Take 40 mg by mouth At Bedtime  Refills:  0     fluticasone 50 MCG/ACT nasal spray  Commonly known as:  FLONASE      Dose:  2 spray  2 sprays At Bedtime  Refills:  12     GABAPENTIN PO      Dose:  300 mg  Take 300 mg by mouth 2 times daily  Refills:  11     insulin lispro prot & lispro (75-25) 100 UNIT/ML pen  Commonly known as:  HumaLOG MIX 75/25 PEN      Dose:  90 Units  Inject 90 Units Subcutaneous 2 times daily 90 units in am, 70 units in pm  Refills:  0     METFORMIN HCL PO      Dose:  1000 mg  Take 1,000 mg by mouth 2 times daily (with meals)  Refills:  0     omeprazole 20 MG DR capsule  Commonly known as:  priLOSEC      Dose:  20 mg  Take 20 mg by mouth At Bedtime  Refills:  0     sulfamethoxazole-trimethoprim 800-160 MG tablet  Commonly known as:  BACTRIM DS/SEPTRA DS  Used for:  Acute cystitis without hematuria      Dose:  1 tablet  Take 1 tablet by mouth 2 times daily  Quantity:  6 tablet  Refills:  0     UNABLE TO FIND      as needed MEDICATION NAME: [facility] itchy eye gtts  Refills:  0     XARELTO 20 MG Tabs tablet  Generic drug:  rivaroxaban ANTICOAGULANT      Dose:  20 mg  Take 20 mg by mouth At Bedtime  Refills:  12         * This list has 2  medication(s) that are the same as other medications prescribed for you. Read the directions carefully, and ask your doctor or other care provider to review them with you.            STOP taking    ondansetron 8 MG tablet  Commonly known as:  ZOFRAN              Where to get your medicines      Some of these will need a paper prescription and others can be bought over the counter. Ask your nurse if you have questions.    Bring a paper prescription for each of these medications    acetaminophen 325 MG tablet    oxyCODONE-acetaminophen 5-325 MG tablet         Consultations:  - Radiation oncology  - Orthopedics    Brief History of Illness:  She is status post diagnostic laparoscopy converted to exploratory laparotomy, LELE, BSO, BPLND, omentectomy, cysto on 8-. Final pathology showed a FIGO Grade 1 endometrioid SUE, 6.5cm greatest dimension, <50% myometrial invasion (0.5/3.2cm), no LVI, negative cytology, negative margins, negative nodes. Omentum, adnexa negative. The patient sought care in 11/2017 for vaginal bleeding and was seen by a local Ob/Gyn. Exam was negative. However, since that time, she reportedly has been bleeding regularly. She had a CT 11/18 which was negative, US 11/18 also negative. She had a pap smear by her PCP showing AGC worrisome for malignancy. She was then seen by another Ob/Gyn [place] [facility] [place]. Exam notable for lesion at vaginal cuff which was biopsied. Path showed invasive SUE consistent with endometrial origin. PET on 12/29/18 showed soft tissue thickening at vaginal cuff concerning for recurrence with loss of fat plane at anterior wall rectum and posterior wall bladder. No metastatic disease. MRI pelvis:  2.1 x 3.6 x 1.8 cm enhancing soft tissue mass in the right vaginal cuff with posterior extension into the anterior wall of the rectum. No evidence of bladder invasion, pelvic lymphadenopathy or metastatic disease. Gyn onc referred to rad onc for EBRT and HDR.    [facility]  Course:  Dz:   - Preoperative diagnosis was localized recurrent FIGO Grade 1 Endometrial Cancer. She will follow-up postoperatively for a care plan.  FEN:   - She was maintained on IVF until POD#1, when her diet was slowly advanced.  By discharge, she was tolerating a regular diet without nausea and vomiting and able to maintain her hydration without IVF supplementation.  Pain:   - Her pain was initially controlled on with an epidural and prn tylenol, oxycodone and IV dilaudid. Epidural was removed at the time of interstitial needle removal. Patient has chronic pain prior to admission and takes percocet at home and will be resumed at time of discharge.  Her pain was well controlled on this and she was discharged home with these medications.  CV:   - She has a history of HLD, continued on home lipitor.  Her vital signs were stable while in house and she had no acute CV issues.  PULM:   - She has a history of pulmonary embolism on xarelto at home. Xarelto was held with epidural in place and resumed at time of discharge. She was given PPX lovenox dosing.  She was initially given O2 supplementation in to maintain her O2 sats in the immediate postop period and was transitioned off of this without difficulty.  By discharge, her O2 sats were greater than 94% on RA.  She was encouraged to use her bedside IS while in house.  She had no acute pulmonary issues while in house.  HEME:   - Her preoperative Hgb was 11.9. She had no other acute heme issues while in house.  GI:   - She was made NPO prior to the procedure.  On POD#0, her diet was advanced slowly as tolerated.  At the time of discharge, she was tolerating a regular diabetic diet without nausea and vomiting.  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 She will be discharged with a bowel regimen to prevent constipation in the postoperative period.  She had no acute GI issues while in house.  :    - A martel catheter was placed at the time of the  surgery. The martel catheter was removed after the interstitial needle removal.  Prior to discharge, the patient was voiding spontaneously without difficulty.  She had no acute  issues while in house.  ID:   - The patient was AF during her hospitalization.    ENDO:   - Hx DM II. Continue home insulin 75/25. She did have occasional episodes of hypoglycemia and her insulin was eventually titrated down to 15/15, likely due to decreased PO intake while interstitial needles were in place. Metformin held during admission. She was given sliding scale insulin. She was discharged with instructions to continue her home insulin regimen, and recommended to have close follow-up with her primary care doctor (with-in one week) for further management.  PSYCH/NEURO:   - Hx anxiety/depression, continued on home prozac.   OTHRO:  - Patient was noted to have a sacral ala fracture on MRI prior to admission. Orthopedic team was consulted to assist with management. They recommended a CT scan to further characterize fracture after removal of interstitial needles. This was performed, and showed no definite fracture. Orthopedics reviewed the imaging and agreed patient was safe for discharge. If patient starts to have difficulty bearing weight, she is recommended to follow-up with her primary care doctor, or the orthopedic clinic for further management. If she does not have pain, no additional follow-up is necessary.  PPX:    -  She was given SCDs, IS, PPI and lovenox during her hospital course.  She tolerated these prophylactic interventions without incident.  They were discontinued at the time of her discharge.    Discharge Instructions and Follow up:  Ms. Lilli Locke was discharged from the hospital with follow up for gyn/onc clinic.    Discharge Diet: Regular  Discharge Activity: Activity as tolerated  Discharge Follow up:   - Primary Care Provider in 1 week to discuss diabetes management  - Follow-up Appointment with Radiation  Oncology on 4/19/19 at 1000  - Gyn/Onc Clinic with Dr. Harris in 4 weeks. Message sent to clinic for scheduling  - If pain w/ bearing weight, follow up with PCP or orthopedic clinic.    Discharge Disposition:  Discharged to home    Discharge Staff: Dr. Reina Tucker MD  OB/GYN PGY-1  03/21/19 9:06 AM    Yohana Huang MD  Gynecologic Oncology  [facility]

## 2019-03-18 NOTE — OR NURSING
Epidural currently infusing at 10 mL/hr. Active order in place for 8 mL/hr. Writer contacted Dr. Hodges to correct the order at this time.

## 2019-03-18 NOTE — OP NOTE
OPERATIVE NOTE:    PREOPERATIVE DIAGNOSIS: Recurrent endometrial carcinoma at the vaginal cuff    POSTOPERATIVE DIAGNOSIS:  Recurrent endometrial carcinoma at the vaginal cuff    OPERATIVE PROCEDURES:   1. Exam under anesthesia.   2. Insertion of high-dose rate interstitial needles under laparoscopic and ultrasound guidance.     ANESTHESIA: General endotracheal.     SURGEONS: Elsy Elizabeth MD Radiation Oncology                        Yohana Huang MD OB/GYN       ASSISTANTS: Armando Betancur MD Radiation oncology resident       Vera Encarnacion MD, Gyn Onc Fellow                          Isatu Trevizo, Medical student     ESTIMATED BLOOD LOSS: 350 cc.     OPERATIVE FINDINGS: Normal external genitalia with no suspicious lesions. Speculum examination showed vaginal mucosa tapered at towards the apex, but no obvious mucosal abnormalities.  On palpation, there is thickening of vaginal tissue along the posterior vaginal wall extending 1-2 cm from the cuff.      COMPLICATIONS: Bleeding encountered during the laparoscopy which was controlled (see Dr. Huang's Op note).    OPERATIVE PROCEDURE IN DETAIL: The consent was reviewed with the patient and the procedure confirmed in the preoperative setting. She had placement of bilateral TEDs and pneumoboots and was transferred to the operating room. Antibiotics was given. She was placed in dorsal lithotomy position and an exam under anesthesia was performed with the above finding. The patient was then prepped and draped in the usual fashion. Timeout was called.    A Cavanaugh catheter was placed into the bladder and a total of 200 mL saline was instilled into the bladder.  One stay suture was applied at 9 o'clock at the vaginal cuff. Obturator was inserted into the vagina with suture tied to the obturator. The template was then fitted over the obturator. At this point, Dr. Huang then performed the laparoscopic procedure and her part of the operation will be dictated  separately.  The template was then secured further by 4 perineal sutures. At this point, intraabdominal bleeding was encountered and ultimately uncontrolled. It was deemed risky to perform further lysis of adhesion in order to see the cuff area.  Thus, it was decided to perform needle insertion based on MRI finding rather than using laparoscopic guidance.  A total of18 interstitial needles were placed using the Vasquez template.  Ultrasound was used to ensure that needles were not perforating the bladder. Needles were secured in place using screw . A rectal exam was performed to ensure that no needle perforated the rectum. A rectal tube was placed and 10 ml saline was used to inflate the balloon. Duoderm was applied between the template edges and the inner thighs.  Anesthesia was reversed and the patient was sent to the PACU in stable condition.      PLAN:  The patient will be sent to radiation oncology department from the PACU after recovery and adequate pain control.  She will undergo a planning CT scan in the radiation oncology department and receive 1st of 5 fractions of HDR treatments this afternoon.        Armando Betancur MD  PGY-2 Resident, Radiation Oncology  [employer]    I was scrubbed and performed the procedure.  I edited the above note and agree with the op finding and description of the procedure.    Elsy Elizabeth MD

## 2019-03-18 NOTE — ANESTHESIA POSTPROCEDURE EVALUATION
Anesthesia POST Procedure Evaluation    Patient: Lilli Locke   MRN:     [id number] Gender:   female   Age:    58 year old :      1960        Preoperative Diagnosis: Endometrial Cancer   Procedure(s):  Exam Under Anesthesia, Insert Interstitial Needles With Ultrasound Guidance  Diagnostic Laparoscopy **Latex Allergy**   Postop Comments: No value filed.       Anesthesia Type:  General    Reportable Event: YES     PAIN:        Airway/Resp.: Reportable or Non-Standard even     Airway Events: Difficult Intubation        Disposition:        Comments/Narrative:  Despite ramping and optimization on OR table for intubation, patient required D-blade + bougie with ETT over bougie for intubation. No significant complications otherwise    Evaluation location: PACU    Mental status: Preoperative baseline. Awake and oriented, responding to questions appropriately  Airway: Nasal cannula  Pain: Well controlled/None  Nausea: None  Vitals: Stable    Disposition: Appropriate to be transferred to floor    ### Note may not be fully accurate (as some portions are automatically generated upon opening) ###             Last Anesthesia Record Vitals:  CRNA VITALS  3/18/2019 0917 - 3/18/2019 1017      3/18/2019             Pulse:  73    SpO2:  92 %    Resp Rate (observed):  9          Last PACU/Preop Vitals:  Vitals:    19 1115 19 1130 19 1145   BP: 107/61 105/60 107/63   Pulse:  61    Resp: 14 15 15   Temp:   36.6  C (97.8  F)   SpO2: 98% 100% 100%         Electronically Signed By: Jani Puri MD, 2019, 12:02 PM

## 2019-03-18 NOTE — OP NOTE
Gynecologic Oncology Operative Report     3/18/2019  Lilli Locke  [id number]     PREOPERATIVE DIAGNOSIS: Recurrent endometrial cancer at the vaginal cuff, need for interstitial brachytherapy     POSTOPERATIVE DIAGNOSIS: Same     PROCEDURES: Diagnostic laparoscopy for placement of interstitial needle placement by Radiation Oncology (please see Dr Elizabeth's operative note for details of the needle placement)     SURGEON: Yohana Huang MD      ASSISTANTS:   Vera Jain MD (Gyn Onc Fellow)  Blessing Tucker MD, PGY-1       ANESTHESIA: General endotracheal with epidural for post-operative pain management     ESTIMATED BLOOD LOSS: 350 cc     IV FLUIDS: See anesthesia record for details     URINE OUTPUT: See anesthesia record for details     INDICATIONS: Ms Locke is a 58 year old who underwent diagnostic laparoscopy converted to exploratory laparotomy, LELE, BSO, BPLND, omentectomy, cysto on 8-. Final pathology showed a FIGO Grade 1 endometrioid SUE, 6.5cm greatest dimension,<50% myometrial invasion (0.5/3.2cm), no LVI, negative cytology, negative margins, negative nodes. Omentum, adnexa negative. She was then found to have a recurrence in December 2018 based on clinical exam and imaging results.The recurrence was at the vaginal cuff  She has completed her external beam radiation therapy and needs to have radiation needles placed for interstitial brachytherapy.     FINDINGS: On EUA the patient had normal external genitalia with no suspicious lesions. Speculum examination showed vaginal mucosa tapered at towards the apex, but no obvious mucosal abnormalities.  On palpation, there is thickening of vaginal tissue along the posterior vaginal wall extending 2-3 cm from the cuff.      On laparoscopy, omentum densely adhered to abdominal wall. Uterus surgically absent, unable to identify vaginal cuff on laparoscopy because of adherent bowel. Bleeding noted from laparoscopic port side on right side from the  abdominal wall. Hemostatic at end of case without intervention. No bowel or omental injury noted on survey.    SPECIMENS: None     COMPLICATIONS: None    CONDITION: Stable to PACU.      PROCEDURE:   Consent was reviewed with the patient in the preoperative setting and confirmed. The patient was transferred to the operating room and placed in dorsal supine position. General anesthetic was obtained in the usual manner without noted difficulties. The patient was then positioned onto Mendoza stirrups and an exam under anesthesia was performed with findings as described above.  The patient was prepped and draped for the above-mentioned procedure and Cavanaugh catheter was then placed under sterile techniques.  Timeout was called at which point the patient's name, procedure and operative site was confirmed by the operative team.  Attention was then turned to the upper abdomen. Initially, an incision was made at the umbilicus and the Veress needle introduced through this stab incision. The abdomen was insufflated with an opening pressure of 5 mmHg. The Veress needle was removed. The initial midline 5 mm port was inserted without difficulties and initial survey revealed no damage to underlying structures.  Two right lateral 5 mm ports were then placed under direct visualization without any injury noted to underlying structures, however we did note some brisk bleeding from the medial 5 mm port site.  The patient was placed into Trendelenberg positioning and the pelvis was inspected as well as the upper abdomen with findings as noted above. Omentum was densely adhered to abdominal wall. Uterus surgically absent, unable to identify vaginal cuff on laparoscopy because of adherent bowel.     Due to dense adhesions, we were unable to identify vaginal cuff on laparoscopy because of adherent bowel. We noted active bleeding into the peritoneal cavity during laparoscopy from the abdominal wall incision of one of 5 mm ports on right side  which subsided on its own without requiring further intervention.  With application of pressure this bleeding did stop.  Given the poor visualization of the cuff, we decided to abort the laparoscopic part of the procedure as we couldn't safely visualize the cuff without a laparotomy incision and Dr Elizabeth was able to complete her portion without direct visualization. No bowel or omental injury noted on survey    All laparoscopic instruments and ports were now removed and CO2 allowed to escape from the abdomen. Skin was reapproximated with 4-0 Vicryl sutures and Dermabond applied. The patient tolerated the procedure well and was taken to the recovery room in stable condition.     Sponge, lap and needle counts were reported as correct x2 and instrument count was correct x1.      Vera Jain MD  Gynecologic Oncology Fellow   [facility]    I was present and scrubbed for the entire procedure.    Yohana Huang MD  Gynecologic Oncology  [facility]

## 2019-03-18 NOTE — BRIEF OP NOTE
[facility]  Brief Operative Note    Date of Surgery: 3/18/2019    Preop Dx:  - Centrally recurrent FIGO Grade 1 Endometrial Cancer    Postop Dx:   - Same s/p procedure    Procedure:   - EUA, Interstitial Needle placement under laparoscopic and ultrasound guidance    Surgeon: Yohana Huang MD    Assistants:   MD Blessing Lauren MD, PGY-1    Anesthesia: General    EBL: 350 cc    IVF: 500 cc    UOP: Not measured    Drains: Cavanaugh catheter placement, rectal tube     Specimen: None     Complications: None apparent    Findings: On bimanual exam, palpable thickening of vaginal cuff. On laparoscopy, omentum densely adhered to abdominal wall. Uterus surgically absent, unable to identify vaginal cuff on laparoscopy because of adherent bowel. Bleeding noted from laparoscopic port on right side. Hemostatic at end of case without intervention. No bowel or omental injury noted on survey.      Disposition: Transferred in stable condition to the PACU    Blessing Tucker MD   Ob/Gyn, PGY-1  March 18, 2019 9:40 AM

## 2019-03-18 NOTE — PROGRESS NOTES
Gynecologic Oncology Postoperative Check Note  3/18/2019    S: patient doing well overall. Pain generally under control. No nausea or vomiting. A little nervous about the bed pan, notes that diarrhea is common for her. Has some itching. No chest pain, SOB. No complaints or concerns.     O:  Vitals:    03/18/19 0700 03/18/19 0705 03/18/19 0710 03/18/19 0715   BP: 135/70 134/71 132/71 115/53   Pulse: 60 55 54 70   Resp: 12 (!) 5 11 19   Temp:       SpO2: 100% 100% 99% 99%   Weight:       Height:           Gen: lying in bed, NAD  Cardio: reg rate, well perfused  Resp: no increased work of breathing  Abdomen: soft, non tender, non distended  Extremities: non tender bilaterally    A: 58 year old POD#0 s/p interstitial needle placement for HDR.    Dz: Recurrent endometrial cancer to the vaginal cuff/pelvis. S/P EBRT now here for interstitial needle placement for HDR.   FEN: Regular diet. Plan for clear liquids after MN on Wednesday then NPO at 0800 am.   Pain: Epidural, with prn oxycodone, tylenol, and IV dilaudid   Heme: Hgb 11.9 >  > 11.8 >11.4> AM   CV: Hx HLD, continue lipitor   Pulm: Hx of PE. Zarelto on hold. Patient will receive PPX dosing of lovenox with epidural in place. Hx QUINTIN. Continue home albuterol PRN. IS every hour while awake.  GI: Recent radiation induced diarrhea, continue home lomotil.   : Cavanaugh in place  ID: NI  Endo: DM II, continue home insulin 75/25 45 units in the AM and 35 units in the PM. Sliding scale insulin. Hold metformin.    Psych/Neuro/MSK: Depression/Anxiety, continue prozac. Bed rest with interstitial needles in place. Continue PTA gabapentin.   PPX: SCDs, plan to order ppx lovenox with stable AM HGB    Hayley Armas PGY4

## 2019-03-18 NOTE — OR NURSING
Epidural currently infusing at 10 mL/hr. Active order now in place for 12 mL/hr. Writer contacted Dr. Hodges again at this time to clarify order.

## 2019-03-18 NOTE — ANESTHESIA CARE TRANSFER NOTE
Patient: Lilli Locke    Procedure(s):  Exam Under Anesthesia, Insert Interstitial Needles With Ultrasound Guidance  Diagnostic Laparoscopy **Latex Allergy**    Diagnosis: Endometrial Cancer  Diagnosis Additional Information: No value filed.    Anesthesia Type:   No value filed.     Note:  Airway :Face Mask  Patient transferred to:PACU  Comments: VSS. Breathing spontaneously at a regular rate with adequate tidal volumes and maintaining O2 sats on 6L face mask. Denies nausea or pain. No apparent complications from anesthesia.     Quinn Hernandez MD, MSc.  Anesthesia Resident, CA-1  Handoff Report: Identifed the Patient, Identified the Reponsible Provider, Reviewed the pertinent medical history, Discussed the surgical course, Reviewed Intra-OP anesthesia mangement and issues during anesthesia, Set expectations for post-procedure period and Allowed opportunity for questions and acknowledgement of understanding      Vitals: (Last set prior to Anesthesia Care Transfer)    CRNA VITALS  3/18/2019 0917 - 3/18/2019 1001      3/18/2019             Pulse:  73    SpO2:  92 %    Resp Rate (observed):  9                Electronically Signed By: Quinn Hernandez MD  March 18, 2019  10:01 AM

## 2019-03-19 ENCOUNTER — ALLIED HEALTH/NURSE VISIT (OUTPATIENT)
Dept: RADIATION ONCOLOGY | Facility: CLINIC | Age: 59
End: 2019-03-19
Attending: RADIOLOGY
Payer: COMMERCIAL

## 2019-03-19 DIAGNOSIS — C54.1 ENDOMETRIAL CANCER (H): Primary | ICD-10-CM

## 2019-03-19 LAB
GLUCOSE BLDC GLUCOMTR-MCNC: 163 MG/DL (ref 70–99)
GLUCOSE BLDC GLUCOMTR-MCNC: 178 MG/DL (ref 70–99)
GLUCOSE BLDC GLUCOMTR-MCNC: 59 MG/DL (ref 70–99)
GLUCOSE BLDC GLUCOMTR-MCNC: 63 MG/DL (ref 70–99)
GLUCOSE BLDC GLUCOMTR-MCNC: 74 MG/DL (ref 70–99)
GLUCOSE BLDC GLUCOMTR-MCNC: 89 MG/DL (ref 70–99)
GLUCOSE BLDC GLUCOMTR-MCNC: 89 MG/DL (ref 70–99)
HGB BLD-MCNC: 10.4 G/DL (ref 11.7–15.7)

## 2019-03-19 PROCEDURE — 82962 GLUCOSE BLOOD TEST: CPT

## 2019-03-19 PROCEDURE — 99232 SBSQ HOSP IP/OBS MODERATE 35: CPT | Mod: GC | Performed by: OBSTETRICS & GYNECOLOGY

## 2019-03-19 PROCEDURE — 25000132 ZZH RX MED GY IP 250 OP 250 PS 637: Performed by: NURSE PRACTITIONER

## 2019-03-19 PROCEDURE — 77772 HDR RDNCL NTRSTL/ICAV BRCHTX: CPT | Mod: XE | Performed by: RADIOLOGY

## 2019-03-19 PROCEDURE — 77280 THER RAD SIMULAJ FIELD SMPL: CPT | Performed by: RADIOLOGY

## 2019-03-19 PROCEDURE — 94660 CPAP INITIATION&MGMT: CPT

## 2019-03-19 PROCEDURE — 40000275 ZZH STATISTIC RCP TIME EA 10 MIN

## 2019-03-19 PROCEDURE — C1717 BRACHYTX, NON-STR,HDR IR-192: HCPCS | Performed by: RADIOLOGY

## 2019-03-19 PROCEDURE — 85018 HEMOGLOBIN: CPT

## 2019-03-19 PROCEDURE — 12000001 ZZH R&B MED SURG/OB UMMC

## 2019-03-19 PROCEDURE — 36415 COLL VENOUS BLD VENIPUNCTURE: CPT

## 2019-03-19 PROCEDURE — 25000128 H RX IP 250 OP 636: Performed by: NURSE PRACTITIONER

## 2019-03-19 PROCEDURE — 25000132 ZZH RX MED GY IP 250 OP 250 PS 637

## 2019-03-19 PROCEDURE — 25800030 ZZH RX IP 258 OP 636: Performed by: STUDENT IN AN ORGANIZED HEALTH CARE EDUCATION/TRAINING PROGRAM

## 2019-03-19 PROCEDURE — 25000128 H RX IP 250 OP 636: Performed by: STUDENT IN AN ORGANIZED HEALTH CARE EDUCATION/TRAINING PROGRAM

## 2019-03-19 PROCEDURE — 77772 HDR RDNCL NTRSTL/ICAV BRCHTX: CPT | Performed by: RADIOLOGY

## 2019-03-19 RX ADMIN — ACETAMINOPHEN 975 MG: 325 TABLET, FILM COATED ORAL at 16:33

## 2019-03-19 RX ADMIN — ACETAMINOPHEN 975 MG: 325 TABLET, FILM COATED ORAL at 00:44

## 2019-03-19 RX ADMIN — ALBUTEROL SULFATE 2 PUFF: 90 AEROSOL, METERED RESPIRATORY (INHALATION) at 01:56

## 2019-03-19 RX ADMIN — FLUTICASONE PROPIONATE 2 SPRAY: 50 SPRAY, METERED NASAL at 21:47

## 2019-03-19 RX ADMIN — GABAPENTIN 300 MG: 300 CAPSULE ORAL at 08:52

## 2019-03-19 RX ADMIN — FLUOXETINE 40 MG: 20 CAPSULE ORAL at 21:44

## 2019-03-19 RX ADMIN — ENOXAPARIN SODIUM 40 MG: 40 INJECTION SUBCUTANEOUS at 08:58

## 2019-03-19 RX ADMIN — OXYCODONE HYDROCHLORIDE 10 MG: 5 TABLET ORAL at 17:30

## 2019-03-19 RX ADMIN — ATORVASTATIN CALCIUM 40 MG: 40 TABLET, FILM COATED ORAL at 21:44

## 2019-03-19 RX ADMIN — OXYCODONE HYDROCHLORIDE 10 MG: 5 TABLET ORAL at 14:13

## 2019-03-19 RX ADMIN — ACETAMINOPHEN 975 MG: 325 TABLET, FILM COATED ORAL at 08:52

## 2019-03-19 RX ADMIN — OXYCODONE HYDROCHLORIDE 10 MG: 5 TABLET ORAL at 21:44

## 2019-03-19 RX ADMIN — INSULIN ASPART 1 UNITS: 100 INJECTION, SOLUTION INTRAVENOUS; SUBCUTANEOUS at 08:57

## 2019-03-19 RX ADMIN — OXYCODONE HYDROCHLORIDE 10 MG: 5 TABLET ORAL at 09:18

## 2019-03-19 RX ADMIN — GABAPENTIN 300 MG: 300 CAPSULE ORAL at 20:27

## 2019-03-19 RX ADMIN — OMEPRAZOLE 20 MG: 20 CAPSULE, DELAYED RELEASE ORAL at 21:44

## 2019-03-19 ASSESSMENT — ACTIVITIES OF DAILY LIVING (ADL)
ADLS_ACUITY_SCORE: 10
ADLS_ACUITY_SCORE: 13

## 2019-03-19 NOTE — PROGRESS NOTES
REGIONAL ANESTHESIA PAIN SERVICE EPIDURAL NOTE  Lilli Locke is a 58 year old female POD #1 s/p INSERT INTERSTITIAL NEEDLE, ULTRASOUND GUIDED  LAPAROSCOPY DIAGNOSTIC (GYN) and placement of L3-4 epidural catheter for pain management.      SUBJECTIVE  Interval History: Overnight no acute events. Patient seen at 0900 having recently returned from radiation therapy, she reports feeling numb from the waist to feet with current epidural infusion. States she has adequate pain control for interstitial needle placement as long as she has scheduled Tylenol, gabapentin (home med), and receives oxycodone PRN (see below).  Reports BLE weakness, unable to lift or move legs, denies paresthesias, circumoral numbness, metallic taste or tinnitus.  Patient on bedrest. Currently tolerating regular diet, denies nausea or vomiting, urinary catheter in place.                 Clinically Aligned Pain Assessment (CAPA):  Comfort (How is your pain?): Tolerable with discomfort  Change in Pain (Since your last medication/intervention?): About the same  Pain Control (How are your pain treatments working?):  Partially effective pain control                    Pain Intensity using Numerical Rating Scale (NRS):    7/10 at rest and 7/10 with activity        Antithrombotic/Thrombolytic Therapy ordered:  None ordered     Analgesic Medications:              Medications related to Pain Management (From now, onward)     Start     Dose/Rate Route Frequency Ordered Stop     03/21/19 1300   acetaminophen (TYLENOL) tablet 650 mg      650 mg Oral EVERY 4 HOURS PRN 03/18/19 1453       03/18/19 2000   gabapentin (NEURONTIN) capsule 300 mg      300 mg Oral 2 TIMES DAILY 03/18/19 1453       03/18/19 1500   acetaminophen (TYLENOL) tablet 975 mg      975 mg Oral EVERY 8 HOURS 03/18/19 1453 03/21/19 1629     03/18/19 1453   oxyCODONE (ROXICODONE) tablet 5-10 mg      5-10 mg Oral EVERY 3 HOURS PRN 03/18/19 1453       03/18/19 1130   bupivacaine (MARCAINE) 0.125  "% in sodium chloride 0.9 % 250 mL EPIDURAL Infusion      10 mL/hr  EPIDURAL CONTINUOUS 03/18/19 1122               OBJECTIVE  Lab Results:        Recent Labs   Lab Test 03/18/19  1753 03/18/19  1053   WBC  --  5.6   RBC  --  3.99   HGB 11.4* 11.8   HCT  --  38.6   MCV  --  97   MCH  --  29.6   MCHC  --  30.6*   RDW  --  14.9   PLT  --  196               Lab Results   Component Value Date     INR 0.99 03/18/2019     INR 1.37 03/07/2019         Vitals:              Temp:  [96.7  F (35.9  C)-99.5  F (37.5  C)] 97.7  F (36.5  C)  Pulse:  [61-74] 74  Heart Rate:  [62-80] 78  Resp:  [12-24] 18  BP: ()/(39-87) 103/41  FiO2 (%):  [30 %] 30 %  SpO2:  [92 %-100 %] 94 %  /41 (BP Location: Left arm)   Pulse 74   Temp 97.7  F (36.5  C) (Oral)   Resp 18   Ht 1.575 m (5' 2\")   Wt 133.3 kg (293 lb 14 oz)   SpO2 94%   BMI 53.75 kg/m          Exam:   GEN: alert and no distress  NEURO/MSK: Extent of sensory blockade:  Strength BLE 2/5  and overall symmetric  SKIN: Epidural catheter site with dressing c/d/i, no tenderness, erythema, heme, edema       ASSESSMENT:  Lilli Locke is a 58 year old female POD #1 s/p INSERT INTERSTITIAL NEEDLE, ULTRASOUND GUIDED  LAPAROSCOPY DIAGNOSTIC (GYN) and placement of L3-4 epidural catheter for pain management.      Patient is receiving adequate analgesia with current multimodal therapy including infusion of bupivacaine 0.125%  at 10 mL/hr.  Motor function affected by local anesthetic epidural blockade.  No evidence of adverse side effects related to local anesthetic. Adequate urine output.       - 0905 decreased current epidural infusion bupivacaine 0.125% to 8 mL/hour,   - antithrombotic/thrombolytic therapy none ordered. Please contact RAPS (#[id number]) prior to any medication changes  - will continue to follow and adjust as needed     -1030 Follow up: Patient reports she is has less numbness and can feel right side of abdomen since epidural rate decreased. Will continue current " infusion until interstitial needles removed tomorrow.        Pato Horton MD on 3/19/2019 at 1:07 PM  RAPS Fellow     RAPS Contact Info (24 hour job code pager is the last 4 digits) For in-house use only:   [employer] phone: [place] [phone], [place] [employer] [phone], [employer] [phone], then enter call-back number.    Text: Use [employer] on the Intranet <Paging/Directory> tab and enter Jobcode ID.   If no call back at any time, contact the hospital  and ask for RAPS attending or backup

## 2019-03-19 NOTE — PLAN OF CARE
Pt AVSS. Capno WNL. IS enc =500-750ml. Pt requested inhaler x1 bc pt c/o feeling like her chest was tight to breathe. Pt placed on bipap per RT with better results. Pt slept following. Lungs dim in bases. Abd obese, +bs. Pt denied c/o's pain epidural working well at 10ml/hr. Denied any s/s LAST. Epidural dressing D/I. Tylenol also given as scheduled. Cavanaugh patent with good uv's noted. Pericares x1 with min drge noted. Pt milton well.  at bs supportive with cares. Cont to maintain pain control. Maintain good pulm hygiene as well as pericares.

## 2019-03-19 NOTE — PLAN OF CARE
Epidural is at 8ml/hr, also taking tylenol and oxycodone to manage pain. Radiation vaginal appliance intact, has gone to radiation this morning and again this afternoon. Observing precautions, on bedrest, HOB less than 15 degrees. Cavanaugh with good urine output, rakesh done this shift. Patient ate breakfast but skipped lunch, blood sugar this afternoon was 63, 59 and then 74 after crackers and apple juice. MD's adjusted and decreased scheduled insulin. Continue to monitor blood sugars.

## 2019-03-19 NOTE — PROGRESS NOTES
A radiation therapy treatment planning simulation was performed.  HDR brachytherapy Vasquez interstitial fraction 3 of 5 was delivered.  Please see the "[employer] record for documentation.    Cadence Rose MD  Radiation Oncology

## 2019-03-19 NOTE — PLAN OF CARE
"/52 (BP Location: Left arm)   Pulse 65   Temp 99.5  F (37.5  C) (Oral)   Resp 17   Ht 1.575 m (5' 2\")   Wt 133.3 kg (293 lb 14 oz)   SpO2 93%   BMI 53.75 kg/m      VSS. Pt on bedrest due to brachytherapy needle placement with HOB restrictions < 15*. Pain controlled with epidural bupivacaine and PRN oxycodone. Tolerating consistent CHO diet. Denies N/V. Passing flatus. No BM today. Cvaanaugh in place with adequate output. PIV infusing MIVF TKO.  spending the night tonight, supportive of cares. Continue POC.   "

## 2019-03-19 NOTE — PROGRESS NOTES
"Gynecologic Oncology Daily Progress Note  3/18/2019     S: Patient doing well overall. Pain generally under control, feeling pretty numb. No nausea or vomiting. No chest pain, SOB. No complaints or concerns this morning. Would like more info on alar fracture.     O:  /41 (BP Location: Left arm)   Pulse 74   Temp 97.7  F (36.5  C) (Oral)   Resp 18   Ht 1.575 m (5' 2\")   Wt 133.3 kg (293 lb 14 oz)   SpO2 94%   BMI 53.75 kg/m     Gen: lying in bed, NAD  Cardio: reg rate, well perfused  Resp: no increased work of breathing  Abdomen: soft, non tender, non distended  Extremities: non tender bilaterally    Labs:  HGB: pending    A: 58 year old POD#1 s/p interstitial needle placement for HDR.    Dz: Recurrent endometrial cancer to the vaginal cuff/pelvis. S/P EBRT now here for interstitial needle placement for HDR.   FEN: Regular diet. Plan for clear liquids after MN on Wednesday then NPO at 0800 am.   Pain: Epidural, with prn oxycodone, tylenol, and IV dilaudid   Heme: Hgb 11.9 >  > 11.8 >11.4> AM pending - asymptomatic  CV: Hx HLD, continue lipitor   Pulm: Hx of PE. Zarelto on hold. Patient will receive PPX dosing of lovenox with epidural in place. Hx QUINTIN. Continue home albuterol PRN. IS every hour while awake.  GI: Recent radiation induced diarrhea, continue home lomotil.   : Cavanaugh in place  ID: NI  Endo: DM II, continue home insulin 75/25 45 units in the AM and 35 units in the PM. Sliding scale insulin. Hold metformin.    Psych/Neuro/MSK: Depression/Anxiety, continue prozac. Bed rest with interstitial needles in place. Continue PTA gabapentin. MR with concern for R sacral ala insufficiency fx, plan for CT sacrum with contrast on Wednesday with ortho consult to follow  PPX: SCDs, plan to order ppx lovenox with stable AM HGB    Hayley Armas PGY4  3/19/2019 7:07 AM     I, Trev Huang personally examined and evaluated this patient on 03/19/19.  I discussed the patient with the resident " and care team, and agree with the assessment and plan of care as documented in the residents note above.    I personally reviewed vital signs, laboratory values and imaging results.    Pt admitted for interstitial needles and brachytherapy.  No new issues today.      Yohana Huang MD  Gynecologic Oncology  [facility]

## 2019-03-19 NOTE — PROGRESS NOTES
A radiation therapy treatment planning simulation was performed.  HDR brachytherapy Vasquez interstitial fraction 2 of 5 was delivered.  Please see the [employer] record for documentation.    Cadence Rose MD  Radiation Oncology

## 2019-03-19 NOTE — PROGRESS NOTES
REGIONAL ANESTHESIA PAIN SERVICE EPIDURAL NOTE  Lilli Locke is a 58 year old female POD #1 s/p INSERT INTERSTITIAL NEEDLE, ULTRASOUND GUIDED  LAPAROSCOPY DIAGNOSTIC (GYN) and placement of L3-4 epidural catheter for pain management.      SUBJECTIVE  Interval History: Overnight no acute events. Patient seen at 0900 having recently returned from radiation therapy, she reports feeling numb from the waist to feet with current epidural infusion. States she has adequate pain control for interstitial needle placement as long as she has scheduled Tylenol, gabapentin (home med), and receives oxycodone PRN (see below).  Reports BLE weakness, unable to lift or move legs, denies paresthesias, circumoral numbness, metallic taste or tinnitus.  Patient on bedrest. Currently tolerating regular diet, denies nausea or vomiting, urinary catheter in place.     Clinically Aligned Pain Assessment (CAPA):  Comfort (How is your pain?): Tolerable with discomfort  Change in Pain (Since your last medication/intervention?): About the same  Pain Control (How are your pain treatments working?):  Partially effective pain control       Pain Intensity using Numerical Rating Scale (NRS):    7/10 at rest and 7/10 with activity      Antithrombotic/Thrombolytic Therapy ordered:  None ordered    Analgesic Medications:  Medications related to Pain Management (From now, onward)    Start     Dose/Rate Route Frequency Ordered Stop    03/21/19 1300  acetaminophen (TYLENOL) tablet 650 mg      650 mg Oral EVERY 4 HOURS PRN 03/18/19 1453      03/18/19 2000  gabapentin (NEURONTIN) capsule 300 mg      300 mg Oral 2 TIMES DAILY 03/18/19 1453      03/18/19 1500  acetaminophen (TYLENOL) tablet 975 mg      975 mg Oral EVERY 8 HOURS 03/18/19 1453 03/21/19 1629    03/18/19 1453  oxyCODONE (ROXICODONE) tablet 5-10 mg      5-10 mg Oral EVERY 3 HOURS PRN 03/18/19 1453      03/18/19 1130  bupivacaine (MARCAINE) 0.125 % in sodium chloride 0.9 % 250 mL EPIDURAL  "Infusion      10 mL/hr  EPIDURAL CONTINUOUS 03/18/19 1122             OBJECTIVE  Lab Results:   Recent Labs   Lab Test 03/18/19  1753 03/18/19  1053   WBC  --  5.6   RBC  --  3.99   HGB 11.4* 11.8   HCT  --  38.6   MCV  --  97   MCH  --  29.6   MCHC  --  30.6*   RDW  --  14.9   PLT  --  196       Lab Results   Component Value Date    INR 0.99 03/18/2019    INR 1.37 03/07/2019       Vitals:    Temp:  [96.7  F (35.9  C)-99.5  F (37.5  C)] 97.7  F (36.5  C)  Pulse:  [61-74] 74  Heart Rate:  [62-80] 78  Resp:  [12-24] 18  BP: ()/(39-87) 103/41  FiO2 (%):  [30 %] 30 %  SpO2:  [92 %-100 %] 94 %  /41 (BP Location: Left arm)   Pulse 74   Temp 97.7  F (36.5  C) (Oral)   Resp 18   Ht 1.575 m (5' 2\")   Wt 133.3 kg (293 lb 14 oz)   SpO2 94%   BMI 53.75 kg/m         Exam:   GEN: alert and no distress  NEURO/MSK: Extent of sensory blockade:  Strength BLE 2/5  and overall symmetric  SKIN: Epidural catheter site with dressing c/d/i, no tenderness, erythema, heme, edema     ASSESSMENT:  Lilli Locke is a 58 year old female POD #1 s/p INSERT INTERSTITIAL NEEDLE, ULTRASOUND GUIDED  LAPAROSCOPY DIAGNOSTIC (GYN) and placement of L3-4 epidural catheter for pain management.      Patient is receiving adequate analgesia with current multimodal therapy including infusion of bupivacaine 0.125%  at 10 mL/hr.  Motor function affected by local anesthetic epidural blockade.  No evidence of adverse side effects related to local anesthetic. Adequate urine output.      - 0905 decreased current epidural infusion bupivacaine 0.125% to 8 mL/hour,   - antithrombotic/thrombolytic therapy none ordered. Please contact RAPS (#[phone]) prior to any medication changes  - will continue to follow and adjust as needed    -1030 Follow up: Patient reports she is has less numbness and can feel right side of abdomen since epidural rate decreased. Will continue current infusion until interstitial needles removed tomorrow.      - discussed plan " with attending anesthesiologist    CHRISTAL Bhakta [facility]  Regional Anesthesia Pain Service  3/19/2019 7:14 AM    RAPS Contact Info (24 hour job code pager is the last 4 digits) For in-house use only:   [employer] phone: [place] [phone], [place] [phone], Peds [phone], then enter call-back number.    Text: Use [employer] on the Intranet <Paging/Directory> tab and enter Jobcode ID.   If no call back at any time, contact the hospital  and ask for RAPS attending or backup

## 2019-03-20 ENCOUNTER — APPOINTMENT (OUTPATIENT)
Dept: CT IMAGING | Facility: CLINIC | Age: 59
DRG: 744 | End: 2019-03-20
Attending: RADIOLOGY
Payer: COMMERCIAL

## 2019-03-20 ENCOUNTER — ANESTHESIA (OUTPATIENT)
Dept: SURGERY | Facility: CLINIC | Age: 59
DRG: 744 | End: 2019-03-20
Payer: COMMERCIAL

## 2019-03-20 ENCOUNTER — ALLIED HEALTH/NURSE VISIT (OUTPATIENT)
Dept: RADIATION ONCOLOGY | Facility: CLINIC | Age: 59
End: 2019-03-20
Attending: RADIOLOGY
Payer: COMMERCIAL

## 2019-03-20 ENCOUNTER — ANESTHESIA EVENT (OUTPATIENT)
Dept: SURGERY | Facility: CLINIC | Age: 59
DRG: 744 | End: 2019-03-20
Payer: COMMERCIAL

## 2019-03-20 DIAGNOSIS — C54.1 ENDOMETRIAL CANCER (H): Primary | ICD-10-CM

## 2019-03-20 LAB
GLUCOSE BLDC GLUCOMTR-MCNC: 137 MG/DL (ref 70–99)
GLUCOSE BLDC GLUCOMTR-MCNC: 74 MG/DL (ref 70–99)
GLUCOSE BLDC GLUCOMTR-MCNC: 92 MG/DL (ref 70–99)
GLUCOSE BLDC GLUCOMTR-MCNC: 96 MG/DL (ref 70–99)
GLUCOSE BLDC GLUCOMTR-MCNC: 98 MG/DL (ref 70–99)

## 2019-03-20 PROCEDURE — 37000008 ZZH ANESTHESIA TECHNICAL FEE, 1ST 30 MIN: Performed by: RADIOLOGY

## 2019-03-20 PROCEDURE — 94660 CPAP INITIATION&MGMT: CPT

## 2019-03-20 PROCEDURE — 82962 GLUCOSE BLOOD TEST: CPT

## 2019-03-20 PROCEDURE — 77772 HDR RDNCL NTRSTL/ICAV BRCHTX: CPT | Mod: XE | Performed by: RADIOLOGY

## 2019-03-20 PROCEDURE — 99232 SBSQ HOSP IP/OBS MODERATE 35: CPT | Mod: GC | Performed by: OBSTETRICS & GYNECOLOGY

## 2019-03-20 PROCEDURE — 40000275 ZZH STATISTIC RCP TIME EA 10 MIN

## 2019-03-20 PROCEDURE — 72192 CT PELVIS W/O DYE: CPT

## 2019-03-20 PROCEDURE — 12000001 ZZH R&B MED SURG/OB UMMC

## 2019-03-20 PROCEDURE — 25000128 H RX IP 250 OP 636: Performed by: NURSE PRACTITIONER

## 2019-03-20 PROCEDURE — 0UPH71Z REMOVAL OF RADIOACTIVE ELEMENT FROM VAGINA AND CUL-DE-SAC, VIA NATURAL OR ARTIFICIAL OPENING: ICD-10-PCS | Performed by: RADIOLOGY

## 2019-03-20 PROCEDURE — 25000128 H RX IP 250 OP 636: Performed by: NURSE ANESTHETIST, CERTIFIED REGISTERED

## 2019-03-20 PROCEDURE — 25000132 ZZH RX MED GY IP 250 OP 250 PS 637: Performed by: STUDENT IN AN ORGANIZED HEALTH CARE EDUCATION/TRAINING PROGRAM

## 2019-03-20 PROCEDURE — 25000132 ZZH RX MED GY IP 250 OP 250 PS 637: Performed by: NURSE PRACTITIONER

## 2019-03-20 PROCEDURE — 77280 THER RAD SIMULAJ FIELD SMPL: CPT | Performed by: RADIOLOGY

## 2019-03-20 PROCEDURE — 25000132 ZZH RX MED GY IP 250 OP 250 PS 637

## 2019-03-20 PROCEDURE — 77336 RADIATION PHYSICS CONSULT: CPT | Performed by: RADIOLOGY

## 2019-03-20 PROCEDURE — 36000057 ZZH SURGERY LEVEL 3 1ST 30 MIN - UMMC: Performed by: RADIOLOGY

## 2019-03-20 PROCEDURE — 77772 HDR RDNCL NTRSTL/ICAV BRCHTX: CPT | Performed by: RADIOLOGY

## 2019-03-20 PROCEDURE — C1717 BRACHYTX, NON-STR,HDR IR-192: HCPCS | Performed by: RADIOLOGY

## 2019-03-20 PROCEDURE — 40000170 ZZH STATISTIC PRE-PROCEDURE ASSESSMENT II: Performed by: RADIOLOGY

## 2019-03-20 PROCEDURE — 25800030 ZZH RX IP 258 OP 636: Performed by: NURSE PRACTITIONER

## 2019-03-20 RX ORDER — NALOXONE HYDROCHLORIDE 0.4 MG/ML
.1-.4 INJECTION, SOLUTION INTRAMUSCULAR; INTRAVENOUS; SUBCUTANEOUS
Status: CANCELLED | OUTPATIENT
Start: 2019-03-20 | End: 2019-03-21

## 2019-03-20 RX ORDER — MEPERIDINE HYDROCHLORIDE 25 MG/ML
12.5 INJECTION INTRAMUSCULAR; INTRAVENOUS; SUBCUTANEOUS
Status: CANCELLED | OUTPATIENT
Start: 2019-03-20

## 2019-03-20 RX ORDER — DIPHENHYDRAMINE HCL 25 MG
25 CAPSULE ORAL EVERY 6 HOURS PRN
Status: DISCONTINUED | OUTPATIENT
Start: 2019-03-20 | End: 2019-03-21 | Stop reason: HOSPADM

## 2019-03-20 RX ORDER — SODIUM CHLORIDE, SODIUM LACTATE, POTASSIUM CHLORIDE, CALCIUM CHLORIDE 600; 310; 30; 20 MG/100ML; MG/100ML; MG/100ML; MG/100ML
INJECTION, SOLUTION INTRAVENOUS CONTINUOUS
Status: CANCELLED | OUTPATIENT
Start: 2019-03-20

## 2019-03-20 RX ORDER — DIPHENHYDRAMINE HYDROCHLORIDE 50 MG/ML
25 INJECTION INTRAMUSCULAR; INTRAVENOUS EVERY 6 HOURS PRN
Status: DISCONTINUED | OUTPATIENT
Start: 2019-03-20 | End: 2019-03-21 | Stop reason: HOSPADM

## 2019-03-20 RX ORDER — FENTANYL CITRATE 50 UG/ML
25-50 INJECTION, SOLUTION INTRAMUSCULAR; INTRAVENOUS
Status: CANCELLED | OUTPATIENT
Start: 2019-03-20

## 2019-03-20 RX ORDER — ONDANSETRON 2 MG/ML
4 INJECTION INTRAMUSCULAR; INTRAVENOUS EVERY 30 MIN PRN
Status: CANCELLED | OUTPATIENT
Start: 2019-03-20

## 2019-03-20 RX ORDER — PROPOFOL 10 MG/ML
INJECTION, EMULSION INTRAVENOUS PRN
Status: DISCONTINUED | OUTPATIENT
Start: 2019-03-20 | End: 2019-03-20

## 2019-03-20 RX ORDER — PROPOFOL 10 MG/ML
INJECTION, EMULSION INTRAVENOUS CONTINUOUS PRN
Status: DISCONTINUED | OUTPATIENT
Start: 2019-03-20 | End: 2019-03-20

## 2019-03-20 RX ORDER — HYDROMORPHONE HYDROCHLORIDE 1 MG/ML
.3-.5 INJECTION, SOLUTION INTRAMUSCULAR; INTRAVENOUS; SUBCUTANEOUS EVERY 10 MIN PRN
Status: CANCELLED | OUTPATIENT
Start: 2019-03-20

## 2019-03-20 RX ORDER — FENTANYL CITRATE 50 UG/ML
INJECTION, SOLUTION INTRAMUSCULAR; INTRAVENOUS PRN
Status: DISCONTINUED | OUTPATIENT
Start: 2019-03-20 | End: 2019-03-20

## 2019-03-20 RX ORDER — ONDANSETRON 4 MG/1
4 TABLET, ORALLY DISINTEGRATING ORAL EVERY 30 MIN PRN
Status: CANCELLED | OUTPATIENT
Start: 2019-03-20

## 2019-03-20 RX ADMIN — ATORVASTATIN CALCIUM 40 MG: 40 TABLET, FILM COATED ORAL at 21:43

## 2019-03-20 RX ADMIN — PROPOFOL 100 MCG/KG/MIN: 10 INJECTION, EMULSION INTRAVENOUS at 16:41

## 2019-03-20 RX ADMIN — OXYCODONE HYDROCHLORIDE 10 MG: 5 TABLET ORAL at 14:08

## 2019-03-20 RX ADMIN — PROPOFOL 20 MG: 10 INJECTION, EMULSION INTRAVENOUS at 16:41

## 2019-03-20 RX ADMIN — MIDAZOLAM 2 MG: 1 INJECTION INTRAMUSCULAR; INTRAVENOUS at 16:38

## 2019-03-20 RX ADMIN — BUPIVACAINE HYDROCHLORIDE: 7.5 INJECTION, SOLUTION EPIDURAL; RETROBULBAR at 10:17

## 2019-03-20 RX ADMIN — FLUTICASONE PROPIONATE 2 SPRAY: 50 SPRAY, METERED NASAL at 21:42

## 2019-03-20 RX ADMIN — OMEPRAZOLE 20 MG: 20 CAPSULE, DELAYED RELEASE ORAL at 21:42

## 2019-03-20 RX ADMIN — ACETAMINOPHEN 975 MG: 325 TABLET, FILM COATED ORAL at 03:37

## 2019-03-20 RX ADMIN — OXYCODONE HYDROCHLORIDE 10 MG: 5 TABLET ORAL at 08:14

## 2019-03-20 RX ADMIN — FLUOXETINE 40 MG: 20 CAPSULE ORAL at 21:42

## 2019-03-20 RX ADMIN — DIPHENHYDRAMINE HYDROCHLORIDE 25 MG: 25 CAPSULE ORAL at 17:39

## 2019-03-20 RX ADMIN — ACETAMINOPHEN 975 MG: 325 TABLET, FILM COATED ORAL at 14:08

## 2019-03-20 RX ADMIN — GABAPENTIN 300 MG: 300 CAPSULE ORAL at 21:43

## 2019-03-20 RX ADMIN — ACETAMINOPHEN 975 MG: 325 TABLET, FILM COATED ORAL at 21:42

## 2019-03-20 RX ADMIN — GABAPENTIN 300 MG: 300 CAPSULE ORAL at 08:14

## 2019-03-20 RX ADMIN — RIVAROXABAN 20 MG: 20 TABLET, FILM COATED ORAL at 21:42

## 2019-03-20 RX ADMIN — OXYCODONE HYDROCHLORIDE 10 MG: 5 TABLET ORAL at 19:09

## 2019-03-20 RX ADMIN — FENTANYL CITRATE 50 MCG: 50 INJECTION, SOLUTION INTRAMUSCULAR; INTRAVENOUS at 16:39

## 2019-03-20 ASSESSMENT — ACTIVITIES OF DAILY LIVING (ADL)
ADLS_ACUITY_SCORE: 10
ADLS_ACUITY_SCORE: 11
ADLS_ACUITY_SCORE: 14
ADLS_ACUITY_SCORE: 11

## 2019-03-20 ASSESSMENT — PAIN DESCRIPTION - DESCRIPTORS
DESCRIPTORS: SORE

## 2019-03-20 NOTE — PLAN OF CARE
"  /52 (BP Location: Left arm)   Pulse 59   Temp 98.5  F (36.9  C) (Oral)   Resp 20   Ht 1.575 m (5' 2\")   Wt 133.3 kg (293 lb 14 oz)   SpO2 100%   BMI 53.75 kg/m      Time: 3321-9141.   Reason for admission: Endometrial CA.   VS: VSS on RA with O2 sats in mid 90s. Afebrile. Capno WDL, D/C'd around 1700.   Activity: Bedrest, HOB<15 degress, repo q2hrs. Calls appropriately.   Neuros: A&Ox4. Neuros intact. C/o vaginal pain managed with scheduled PO Tylenol and PRN PO Oxycodone.   Cardiac: WDL. HR stable in 60s. Denies chest pain.   Respiratory: WDL. LS clear. Denies SOB.   GI/: Cavanaugh in place, patent, with adequate UOP. No BM on this shift, LBM 3/18. +BS, +gas. Denies nausea or vomiting.   Diet: Consistent CHO diet, tolerating well. BG stable at 89 & 89.   Skin: Vaginal radiation appliance in place, pericares completed.    Lines: Right PIV infusing D5 1/2NS at 10 mL/hr. Epidural infusing at 8 mL/hr, dressing CDI.   Labs: WDL.   New changes this shift/Plan: No new changes this shift. Repo q2hrs in bed. Pericares completed. Pain well managed.   Will continue to monitor & follow POC.  "

## 2019-03-20 NOTE — PROGRESS NOTES
REGIONAL ANESTHESIA PAIN SERVICE EPIDURAL NOTE  Lilli Locke is a 58 year old female POD #2 s/p INSERT INTERSTITIAL NEEDLE, ULTRASOUND GUIDED  LAPAROSCOPY DIAGNOSTIC (GYN) and placement of L3-4 epidural catheter for pain management.      SUBJECTIVE  Interval History: Overnight no acute events. Patient reports increased pain this morning, last dose oxycodone 2145 yesterday, additionally receiving epidural infusion, scheduled Tylenol, and home med gabapentin (see below).  Denies new onset weakness and states she is happier with increased sensation and motor function of BLE.  Denies paresthesias, circumoral numbness, metallic taste or tinnitus.  Denies nausea or vomiting, urinary catheter in place.     Clinically Aligned Pain Assessment (CAPA):  Comfort (How is your pain?): Tolerable with discomfort  Change in Pain (Since your last medication/intervention?): About the same  Pain Control (How are your pain treatments working?):  Partially effective pain control  Functioning (Are you able to do activities to get better?) : Pain keeps me from doing most of what I need to do     Pain Intensity using Numerical Rating Scale (NRS):    6/10 at rest and 7/10 with activity      Antithrombotic/Thrombolytic Therapy ordered:  Last dose enoxaparin administered yesterday 3/19/19  at 0858     Analgesic Medications:  Medications related to Pain Management (From now, onward)    Start     Dose/Rate Route Frequency Ordered Stop    03/21/19 1300  acetaminophen (TYLENOL) tablet 650 mg      650 mg Oral EVERY 4 HOURS PRN 03/18/19 1453      03/18/19 2000  gabapentin (NEURONTIN) capsule 300 mg      300 mg Oral 2 TIMES DAILY 03/18/19 1453      03/18/19 1500  acetaminophen (TYLENOL) tablet 975 mg      975 mg Oral EVERY 8 HOURS 03/18/19 1453 03/21/19 1959    03/18/19 1453  oxyCODONE (ROXICODONE) tablet 5-10 mg      5-10 mg Oral EVERY 3 HOURS PRN 03/18/19 1453      03/18/19 1130  bupivacaine (MARCAINE) 0.125 % in sodium chloride 0.9 % 250 mL  "EPIDURAL Infusion      8 mL/hr  EPIDURAL CONTINUOUS 03/18/19 1122             OBJECTIVE  Lab Results:   Recent Labs   Lab Test 03/19/19  0702  03/18/19  1053   WBC  --   --  5.6   RBC  --   --  3.99   HGB 10.4*   < > 11.8   HCT  --   --  38.6   MCV  --   --  97   MCH  --   --  29.6   MCHC  --   --  30.6*   RDW  --   --  14.9   PLT  --   --  196    < > = values in this interval not displayed.       Lab Results   Component Value Date    INR 0.99 03/18/2019    INR 1.37 03/07/2019       Vitals:    Temp:  [96.6  F (35.9  C)-98.5  F (36.9  C)] 96.6  F (35.9  C)  Pulse:  [57-59] 57  Heart Rate:  [66-70] 70  Resp:  [14-23] 20  BP: (102-125)/(36-64) 125/64  FiO2 (%):  [30 %] 30 %  SpO2:  [92 %-100 %] 100 %  /64 (BP Location: Left arm)   Pulse 57   Temp 96.6  F (35.9  C) (Axillary)   Resp 20   Ht 1.575 m (5' 2\")   Wt 133.3 kg (293 lb 14 oz)   SpO2 100%   BMI 53.75 kg/m         Exam:   GEN: alert and no distress  NEURO/MSK: Strength dorsi/plantar flexion 5/5, unable to bend knees  SKIN: Epidural catheter site with dressing c/d/i, no tenderness, erythema, heme, edema     ASSESSMENT:    Lilli Locke is a 58 year old female POD #2 s/p INSERT INTERSTITIAL NEEDLE, ULTRASOUND GUIDED  LAPAROSCOPY DIAGNOSTIC (GYN) and placement of L3-4 epidural catheter for pain management.  Patient is receiving adequate analgesia with current multimodal therapy including infusion of bupivacaine 0.125%  at 8 mL/hr.  Motor function improved since rate decrease yesterday and adequate sensory block for perineal pain, is meeting activity goals.  No evidence of adverse side effects related to local anesthetic. Adequate urine output via Cavanaugh.  Plans for OR later today after radiation therapy.    - continue current epidural infusion bupivacaine 0.125% at 8 mL/hour, will remove epidural at time of surgery today  - antithrombotic/thrombolytic therapy none ordered. Please contact RAPS (#[id number]) prior to any medication changes  - will continue " to follow and adjust as needed    - discussed plan with attending anesthesiologist    CHRISTAL Bhakta [facility]  Regional Anesthesia Pain Service  3/20/2019 10:12 AM    RAPS Contact Info (24 hour job code pager is the last 4 digits) For in-house use only:   [employer] phone: [place] [phone], [place] [phone], Peds [phone], then enter call-back number.    Text: Use [employer] on the Intranet <Paging/Directory> tab and enter Jobcode ID.   If no call back at any time, contact the hospital  and ask for RAPS attending or backup

## 2019-03-20 NOTE — PROGRESS NOTES
"Gynecologic Oncology Daily Progress Note  3/18/2019      S: Patient doing well overall. Pain well controlled. No nausea or vomiting. No chest pain, SOB. No complaints or concerns this morning.      O:  /52 (BP Location: Left arm)   Pulse 59   Temp 98.5  F (36.9  C) (Oral)   Resp 20   Ht 1.575 m (5' 2\")   Wt 133.3 kg (293 lb 14 oz)   SpO2 100%   BMI 53.75 kg/m     Gen: lying in bed, NAD  Cardio: reg rate, well perfused  Resp: no increased work of breathing  Abdomen: soft, non tender, non distended  Extremities: non tender bilaterally     Labs:  Hgb 11.9 >  > 11.8 >11.4> 10.4       A: 58 year old POD#2 s/p interstitial needle placement for HDR.     Dz: Recurrent endometrial cancer to the vaginal cuff/pelvis. S/P EBRT now here for interstitial needle placement for HDR.   FEN: Clear liquids overnight with plan for NPO at 0800 today.   Pain: Epidural, with prn oxycodone, tylenol, and IV dilaudid   Heme: appropriate drop in HGB post procedure, asymptomatic  CV: Hx HLD, continue lipitor   Pulm: Hx of PE. Zarelto on hold, s/p ppx lovenox yesterday-- held this morning pending removal of needles. Hx QUINTIN. Continue home albuterol PRN. IS every hour while awake.  GI: Recent radiation induced diarrhea, continue home lomotil.   : Cavanaugh in place, plan to remove later today.  ID: NI  Endo: DM II, home insulin decreased to 15 units in AM/PM given mild hypoglycemia, sliding scale insulin ordered. Holding metformin.    Psych/Neuro/MSK: Depression/Anxiety, continue prozac. Bed rest with interstitial needles in place. Continue PTA gabapentin. MR with concern for R sacral ala insufficiency fx, plan for CT sacrum with contrast today once needles removed with ortho consult to follow   PPX: SCDs, further anticoagulation held today given planned needle removal     Hayley Armas PGY4  3/20/2019 6:29 AM     ITrev personally examined and evaluated this patient on 03/20/19.  I discussed the patient " with the resident and care team, and agree with the assessment and plan of care as documented in the residents note above.    I personally reviewed vital signs, laboratory values and imaging results.    Pt tolerating brachytherapy well.  Incidental sacral fracture on treatment planning MRI, will plan for CT per ortho recommendations once needles removed and ortho consult after that.      Yohana Huang MD  Gynecologic Oncology  [facility]

## 2019-03-20 NOTE — OR NURSING
Very short time to access before patient left for OR. Patient has some allergies but computer did not let this nurse chart that they were reviewed.

## 2019-03-20 NOTE — OP NOTE
Pre-op Diagnosis: Recurrent Endometrial Cancer in vaginal cuff    Post-op Diagnosis: Same    Procedure: Removal of interstitial needles under sedation; Removal of epidural catheter    Surgeon: Dr. Elsy Elizabeth    Assistant: None    Anesthesia: MAC    Complication: None    EBL: < 1 ml    Procedure: Ms. Locke was taken to the OR awake and identified to be herself.  A timeout was undertaken.  She was placed in supine in frog leg position.  After sedative was given, the 4 sutures holding the template to the perineum was removed.  The suture holding the template to the vaginal cuff was also cut.  The entire template with obturator and needles was then pulled.  Pressure was applied at the introitus.  No bleeding was encountered. She was then turned to to her side and the epidural catheter was removed without difficulty.      Disposition: She was taken back to  in stable condition.  She may be discharged per primary team discretion.        Elsy Elizabeth MD

## 2019-03-20 NOTE — ANESTHESIA POSTPROCEDURE EVALUATION
Anesthesia POST Procedure Evaluation    Patient: Lilli Locke   MRN:     [id number] Gender:   female   Age:    58 year old :      1960        Preoperative Diagnosis: Endometrial Cancer   Procedure(s):  Removal Of Interstitial Needles   Postop Comments: No value filed.       Anesthesia Type:  MAC    Reportable Event: NO     PAIN: Uncomplicated   Sign Out status: Comfortable, Well controlled pain     PONV: No PONV   Sign Out status:  No Nausea or Vomiting     Neuro/Psych: Uneventful perioperative course   Sign Out Status: Preoperative baseline; Age appropriate mentation     Airway/Resp.: Uneventful perioperative course   Sign Out Status: Non labored breathing, age appropriate RR; Resp. Status within EXPECTED Parameters     CV: Uneventful perioperative course   Sign Out status: Appropriate BP and perfusion indices; Appropriate HR/Rhythm     Disposition:   Sign Out in:  PACU  Disposition:  Floor  Recovery Course: Uneventful  Follow-Up: Not required           Last Anesthesia Record Vitals:  CRNA VITALS  3/20/2019 1625 - 3/20/2019 1707      3/20/2019             NIBP:  121/64    Pulse:  71    NIBP Mean:  85    SpO2:  97 %    Resp Rate (observed):  14    Resp Rate (set):  10    EKG:  Sinus rhythm          Last PACU/Preop Vitals:  Vitals:    19 0745 19 1143 19 1501   BP: 125/64 131/61 126/54   Pulse: 57 62 66   Resp: 20 18 18   Temp: 35.9  C (96.6  F) 35.8  C (96.5  F) 36.4  C (97.6  F)   SpO2: 100% 96% 98%         Electronically Signed By: Mikey Underwood MD, 2019, 5:07 PM

## 2019-03-20 NOTE — ANESTHESIA CARE TRANSFER NOTE
Patient: Lilli Locke    Procedure(s):  Removal Of Interstitial Needles    Diagnosis: Endometrial Cancer  Diagnosis Additional Information: No value filed.    Anesthesia Type:   No value filed.     Note:  Airway :Room Air  Patient transferred to:Medical/Surgical Unit  Comments:   Transferred to: 7C on RA by orderly.  Patient vital signs: stable  Alert and conversing. Report called to LATASHA Valdes RN.        Handoff Report: Identifed the Patient, Identified the Reponsible Provider, Reviewed the pertinent medical history, Discussed the surgical course, Reviewed Intra-OP anesthesia mangement and issues during anesthesia, Set expectations for post-procedure period and Allowed opportunity for questions and acknowledgement of understanding      Vitals: (Last set prior to Anesthesia Care Transfer)    CRNA VITALS  3/20/2019 1625 - 3/20/2019 1703      3/20/2019             NIBP:  121/64    Pulse:  71    NIBP Mean:  85    SpO2:  97 %    Resp Rate (observed):  14    Resp Rate (set):  10    EKG:  Sinus rhythm                Electronically Signed By: CHRISTAL Harris CRNA  March 20, 2019  5:03 PM

## 2019-03-20 NOTE — ANESTHESIA PREPROCEDURE EVALUATION
Anesthesia Pre-Procedure Evaluation    Patient: Lilli Locke   MRN:     [id number] Gender:   female   Age:    58 year old :      1960        Preoperative Diagnosis: Endometrial Cancer   Procedure(s):  Removal Of Interstitial [place]     Past Medical History:   Diagnosis Date     Antiplatelet or antithrombotic long-term use     on Xarelto since  for unprovoked PE     Complication of anesthesia     PONV following hysterectomy     Diabetes mellitus type 2, insulin dependent (H)     on insulin & metformin     Diabetic neuropathy, type II diabetes mellitus (H)     hands & feet     Dyspnea on exertion      Endometrial cancer (H)      GERD (gastroesophageal reflux disease)     on omeprazole     Hx of pulmonary embolus      Mixed hyperlipidemia      Obesity     BMI 55     Obstructive sleep apnea     has CPAP     Substance abuse (H)      Uncomplicated asthma     Uses albuterol inhaler 1-2x/yr mostly during summer months      Past Surgical History:   Procedure Laterality Date      SECTION      x2     DENTAL SURGERY      wisdom teeth extraction     HYSTERECTOMY TOTAL ABDOMINAL, BILATERAL SALPINGO-OOPHORECTOMY, COMBINED      cancer     INSERT INTERSTITIAL NEEDLE, ULTRASOUND GUIDED N/A 3/18/2019    Procedure: Exam Under Anesthesia, Insert Interstitial Needles With Ultrasound Guidance;  Surgeon: Elsy Elizabeth MD;  Location: [facility] OR     LAPAROSCOPY DIAGNOSTIC (GYN) N/A 3/18/2019    Procedure: Diagnostic Laparoscopy **Latex Allergy**;  Surgeon: Yohana Lau MD;  Location: [facility] OR     TUBAL LIGATION            Anesthesia Evaluation     . Pt has had prior anesthetic. Type: General and MAC    History of anesthetic complications   - PONV  PONV following hysterectomy; tolerated MAC w/ colonscopy       ROS/MED HX    ENT/Pulmonary: Comment: Uses albuterol inhaler approx. 1-2x/year, more during summer months    (+)sleep apnea, Intermittent asthma Last exacerbation: 3/4/19,Treatment:  Inhaler prn,  uses CPAP , . .    Neurologic: Comment: Neuropathy in hands & feet - on gabapentin    (+)neuropathy - diabetic neuropathy, hands & feet, on gabapentin,     Cardiovascular:     (+) Dyslipidemia, ----. Taking blood thinners Pt has not received instructions: Instructions Given to patient: stop Xarelto 72 hrs prior to surgery. . LANDRY, . :. . Previous cardiac testing Echodate:4/19/13results:Stress Testdate:9/8/00 results:ECG reviewed date:2/5/15 results: date: results:          METS/Exercise Tolerance: Comment: Unable to walk 1 block w/o stopping, avoids stairs, able to dust & do light housework, denies CP/angina 1 - Eating, dressing   Hematologic: Comments: Had unprovoked PE, started coumadin 2013, switched to xarelto after hysterectomy    (+) History of blood clots pt is anticoagulated, -      Musculoskeletal:  - neg musculoskeletal ROS       GI/Hepatic: Comment: On omeprazole    (+) GERD Asymptomatic on medication,      (-) liver disease   Renal/Genitourinary:  - ROS Renal section negative   (+) Pt has no history of transplant,    (-) renal disease   Endo: Comment: Takes daily insulin & metformin    (+) type II DM Last HgA1c: 9.5 date: 11/5/18 Using insulin - not using insulin pump Normal glucose range: 108-141 per pt report not previously admitted for DM/DKA Diabetic complications: neuropathy, Obesity, .      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 Rate: Normal     Resp. Effort: Normal     "  CV: Rhythm: Regular  Rate: Age appropriate  Heart: Normal Sounds   Comments:      Dental:  Dental Comments: Multiple posterior chipped teeth bilaterally                Lab Results   Component Value Date    WBC 5.6 03/18/2019    HGB 10.4 (L) 03/19/2019    HCT 38.6 03/18/2019     03/18/2019     03/07/2019    POTASSIUM 3.5 03/18/2019    CHLORIDE 105 03/07/2019    CO2 28 03/07/2019    BUN 6 (L) 03/07/2019    CR 0.96 03/18/2019     (H) 03/07/2019    NEFTALI 8.7 03/07/2019    INR 0.99 03/18/2019       Preop Vitals  BP Readings from Last 3 Encounters:   03/20/19 126/54   03/11/19 146/77   03/07/19 142/61    Pulse Readings from Last 3 Encounters:   03/20/19 66   03/11/19 72   03/07/19 67      Resp Readings from Last 3 Encounters:   03/20/19 18   03/07/19 18    SpO2 Readings from Last 3 Encounters:   03/20/19 98%   03/07/19 97%   02/18/19 94%      Temp Readings from Last 1 Encounters:   03/20/19 36.4  C (97.6  F) (Oral)    Ht Readings from Last 1 Encounters:   03/18/19 1.575 m (5' 2\")      Wt Readings from Last 1 Encounters:   03/18/19 133.3 kg (293 lb 14 oz)    Estimated body mass index is 53.75 kg/m  as calculated from the following:    Height as of this encounter: 1.575 m (5' 2\").    Weight as of this encounter: 133.3 kg (293 lb 14 oz).     LDA:  Peripheral IV 03/18/19 Right Upper forearm (Active)   Site Assessment WDL 3/19/2019  4:00 PM   Line Status Infusing 3/19/2019  4:00 PM   Phlebitis Scale 0-->no symptoms 3/19/2019  4:00 PM   Infiltration Scale 0 3/19/2019  4:00 PM   Infiltration Site Treatment Method  None 3/19/2019  4:00 PM   Extravasation? No 3/19/2019  4:00 PM   Number of days: 2       Intrathecal/Epidural Catheter 03/18/19 (Active)   Site Assessment Clean, dry, intact 3/20/2019 11:44 AM   Line Status Infusing 3/20/2019 11:44 AM   Dressing Type Transparent 3/20/2019 11:44 AM   Dressing Status Dressing  dry and intact 3/20/2019 12:34 AM   Number of days: 2       Urethral Catheter " Double-lumen;Non-latex;Straight-tip 16 fr (Active)   Tube Description Positional 3/20/2019 11:44 AM   Catheter Care Done;Catheter wipes 3/20/2019 11:44 AM   Collection Container Standard 3/20/2019 11:44 AM   Securement Method Securing device (Describe) 3/20/2019 11:44 AM   Rationale for Continued Use Epidural/Intrathecal Catheter 3/20/2019 11:44 AM   Urine Output 150 mL 3/20/2019  3:00 PM   Number of days: 2            Assessment:   ASA SCORE: 3       Documentation: H&P complete; Preop Testing complete; Consents complete   Proceeding: Proceed without further delay  Tobacco Use:  NO Active use of Tobacco/UNKNOWN Tobacco use status     Plan:   Anes. Type:  MAC   Pre-Induction: Midazolam IV   Induction:  Not applicable   Airway: Native Airway   Access/Monitoring: PIV   Maintenance: N/a   Emergence: N/a   Logistics: Same Day Surgery     Postop Pain/Sedation Strategy:  Standard-Options: Opioids PRN     PONV Management:  Adult Risk Factors: Female, Non-Smoker, Postop Opioids  Prevention: Ondansetron     CONSENT: Direct conversation   Plan and risks discussed with: Patient   Blood Products: Consent Deferred (Minimal Blood Loss)                         Mikey Underwood MD

## 2019-03-20 NOTE — PLAN OF CARE
VSs for pt.States pain is fairly well controlled with oxycodone prn,epidural and tylenol.Epidural site is cdi,infusing without difficulty.Denies LAST symptoms but is unable to bend knee without assistance.Cavanaugh is patent.Xena cares done.Blood glucose around 2 pm was 74,50cc's apple juice given with meds,has otherwise been NPO since 8 am.Is currently on way to radiation therapy.Will discharge tomorrow after tests complete evaluating sacral fracture..Wears CPAP at night.

## 2019-03-20 NOTE — PLAN OF CARE
VSS.  Appliance intact. Scant shannen drainage.  Epidural controlling pain well @ 8 ml/hr. Cartridge has 10 hours left.  I did not order a new one.   Cavanaugh with ok UV. Dropping off a little this am.  No LAST symptoms noted.  Denies nausea.  Wearing CAPA overnight with 100% sats.  Shannen cares done at the end of my shift. Will get her 2 doses of radiation today and go to the OR @ 1600 for removal of appliance. Has what appears to be a sacral fracture.  Will get a CT scan after needles are removed and see ortho.   Expect discharge home tomorrow.

## 2019-03-21 VITALS
DIASTOLIC BLOOD PRESSURE: 55 MMHG | HEIGHT: 62 IN | BODY MASS INDEX: 53.92 KG/M2 | HEART RATE: 66 BPM | WEIGHT: 293 LBS | TEMPERATURE: 97.5 F | OXYGEN SATURATION: 93 % | RESPIRATION RATE: 15 BRPM | SYSTOLIC BLOOD PRESSURE: 133 MMHG

## 2019-03-21 LAB
GLUCOSE BLDC GLUCOMTR-MCNC: 152 MG/DL (ref 70–99)
GLUCOSE BLDC GLUCOMTR-MCNC: 199 MG/DL (ref 70–99)

## 2019-03-21 PROCEDURE — 82962 GLUCOSE BLOOD TEST: CPT

## 2019-03-21 PROCEDURE — 25000132 ZZH RX MED GY IP 250 OP 250 PS 637

## 2019-03-21 PROCEDURE — 94660 CPAP INITIATION&MGMT: CPT

## 2019-03-21 PROCEDURE — 99238 HOSP IP/OBS DSCHRG MGMT 30/<: CPT | Mod: GC | Performed by: OBSTETRICS & GYNECOLOGY

## 2019-03-21 PROCEDURE — 40000275 ZZH STATISTIC RCP TIME EA 10 MIN

## 2019-03-21 RX ORDER — ACETAMINOPHEN 325 MG/1
650 TABLET ORAL EVERY 4 HOURS PRN
Qty: 100 TABLET | Refills: 0 | Status: SHIPPED | OUTPATIENT
Start: 2019-03-21 | End: 2019-03-21

## 2019-03-21 RX ORDER — OXYCODONE HYDROCHLORIDE 5 MG/1
5 TABLET ORAL EVERY 6 HOURS PRN
Qty: 5 TABLET | Refills: 0 | Status: SHIPPED | OUTPATIENT
Start: 2019-03-21 | End: 2019-04-23

## 2019-03-21 RX ORDER — ACETAMINOPHEN 325 MG/1
650 TABLET ORAL EVERY 6 HOURS PRN
Qty: 100 TABLET | Refills: 0 | COMMUNITY
Start: 2019-03-21

## 2019-03-21 RX ORDER — OXYCODONE AND ACETAMINOPHEN 5; 325 MG/1; MG/1
1 TABLET ORAL EVERY 6 HOURS PRN
Qty: 5 TABLET | Refills: 0 | Status: SHIPPED | OUTPATIENT
Start: 2019-03-21 | End: 2019-03-21

## 2019-03-21 RX ADMIN — INSULIN ASPART 2 UNITS: 100 INJECTION, SOLUTION INTRAVENOUS; SUBCUTANEOUS at 10:12

## 2019-03-21 RX ADMIN — OXYCODONE HYDROCHLORIDE 10 MG: 5 TABLET ORAL at 10:18

## 2019-03-21 RX ADMIN — GABAPENTIN 300 MG: 300 CAPSULE ORAL at 08:22

## 2019-03-21 RX ADMIN — OXYCODONE HYDROCHLORIDE 10 MG: 5 TABLET ORAL at 06:00

## 2019-03-21 RX ADMIN — ACETAMINOPHEN 975 MG: 325 TABLET, FILM COATED ORAL at 06:00

## 2019-03-21 ASSESSMENT — PAIN DESCRIPTION - DESCRIPTORS
DESCRIPTORS: ACHING;CRAMPING
DESCRIPTORS: ACHING
DESCRIPTORS: ACHING

## 2019-03-21 ASSESSMENT — ACTIVITIES OF DAILY LIVING (ADL)
ADLS_ACUITY_SCORE: 11

## 2019-03-21 NOTE — PLAN OF CARE
VSS.  Rested well overnight with CPAP on. Now sitting up in chair.  Able to bear weight and transfer self independently this morning. Denies numbness or tingling, no LAST symptoms.  Small lap site CDI on abd. Cavanaugh remains in place with good UV.  Suspect  will be discontinued this am.  Passing flatus, no stool.  Xena area pain controlled with oxycodone and tylenol.  Scant amount of bloody vaginal discharge. IV discontinued due to infiltration.  Not restarted with plans to go home this am.

## 2019-03-21 NOTE — PROGRESS NOTES
"Gynecologic Oncology Daily Progress Note  3/18/2019      S: Patient doing well overall. Needle removal yesterday went well. Martel remains in place. Pain well controlled. No nausea or vomiting. No chest pain, SOB. No complaints or concerns this morning.      O:  /54 (BP Location: Left arm)   Pulse 66   Temp 99.2  F (37.3  C) (Oral)   Resp 16   Ht 1.575 m (5' 2\")   Wt 133.3 kg (293 lb 14 oz)   SpO2 95%   BMI 53.75 kg/m     Gen: lying in bed, NAD  Cardio: reg rate, well perfused  Resp: no increased work of breathing  Abdomen: soft, non tender, non distended  Extremities: non tender bilaterally     Labs:  Hgb 11.9 >  > 11.8 >11.4> 10.4     B-152    A: 58 year old POD#2 s/p interstitial needle placement for HDR.     Dz: Recurrent endometrial cancer to the vaginal cuff/pelvis. S/P EBRT now here for interstitial needle placement for HDR.   FEN: Regular diet  Pain: Epidural, with prn oxycodone, tylenol, and IV dilaudid   Heme: appropriate drop in HGB post procedure, asymptomatic  CV: Hx HLD, continue lipitor   Pulm: Hx of PE. Zarelto on hold, s/p ppx lovenox yesterday-- plan to restart this AM. Hx QUINTIN. Continue home albuterol PRN. IS every hour while awake.  GI: Recent radiation induced diarrhea, continue home lomotil.   : martel in place, plan TOV this AM.  ID: NI  Endo: DM II, home insulin decreased to 15 units in AM/PM given mild hypoglycemia, sliding scale insulin ordered. Holding metformin.    Psych/Neuro/MSK: Depression/Anxiety, continue prozac. Continue PTA gabapentin. MR with concern for R sacral ala insufficiency fx, CT of the sacrum without definitive fracture-- will plan to have ortho consult today prior to DC  PPX: SCDs, further anticoagulation held today given planned needle removal     Hayley Armas PGY4  3/21/2019 6:12 AM     ITrev personally examined and evaluated this patient on 19.  I discussed the patient with the resident and care team, and agree " with the assessment and plan of care as documented in the residents note above.    I personally reviewed vital signs, laboratory values and imaging results.    Pt is s/p brachytherapy.  MRI had demonstrated a sacral fracture, however CT did not demonstrate this.  She has been cleared by orthopedics to discharge home with no restrictions.    Yohana Huang MD  Gynecologic Oncology  [facility]

## 2019-03-21 NOTE — PROGRESS NOTES
Orthopaedic Surgery Plan Note:    Interval history:  Orthopaedic surgery was paged about this patient given incidental MRI findings of a possible sacral fracture without clinical pain.    CT scan was obtained and did not demonstrate definitive evidence of fracture.    Plan:  As long as the patient continues to not have pain, she is safe to weight-bear as tolerated and discharge home from an orthopaedic surgery perspective.      If she begins to develop pelvic or back pain, she should follow-up in clinic, either with her PCP or in our ortho clinic.    Please call or page orthopaedic surgery if further questions or concerns arise.    Patient discussed with Dr. Leonid Prieto.      Gerard Guerrier MD  Orthopaedic Surgery, PGY-1  Pager: [phone]

## 2019-03-21 NOTE — PLAN OF CARE
Pt went down for last brachytherapy appt and then had interstitial needles removed around change of shift, arrived back on unit around 1700. AVSS on RA. CPAP used overnight. Pt taking sched tylenol and prn oxycodone for perineum discomfort. Some vaginal bleeding present. Cavanaugh in place w/ adequate UOP. Mod con CHO diet, denies nausea. BG checks WNL. Passing gas, +BS. PIV infusing MIVF. Pt ind positioning in bed, BLE movement much improved after epidural removal. Plan is to discharge tomorrow. Cont w/ POC.

## 2019-03-21 NOTE — PLAN OF CARE
VSS. Abdominal pain managed with Oxycodone and Tylenol. Tolerating regular diet. Passing gas and having bowel movements. Cavanaugh removed and patient was able to void. Scant vaginal bleeding. Lap sites c/d/I.     Discharge instructions reviewed with patient and her . Verbalized understanding of instructions, follow up appointments and medications. New medication received from pharmacy, transported home with family.

## 2019-03-22 ENCOUNTER — ONCOLOGY VISIT (OUTPATIENT)
Dept: RADIATION ONCOLOGY | Facility: CLINIC | Age: 59
End: 2019-03-22

## 2019-03-22 ENCOUNTER — CARE COORDINATION (OUTPATIENT)
Dept: ONCOLOGY | Facility: CLINIC | Age: 59
End: 2019-03-22

## 2019-03-22 NOTE — PROGRESS NOTES
"RN reached out to patient for post hospital call. Patient completed radiation while inpatient.     Patient denies and symptoms pf concern.     Patient is having \"intensity\" with urination and frequent urination.     RN and patient discussed normal symptoms versus more concerning symptoms and when to seek more urgent medical attention.     Patient's pain is tolerable and over all feeling well.     Patient was very appreciative of the call.     Call back numbers reviewed.     Graciela Eller RN    "

## 2019-03-28 NOTE — PROCEDURES
Radiotherapy Treatment Summary          Date of Report: 2019     PATIENT: LILLI LOCKE  MEDICAL RECORD NO: [id number]  : 1960     DIAGNOSIS: C54.1 Malignant neoplasm of endometrium  INTENT OF RADIOTHERAPY: Cure  PATHOLOGY:   adenocarcinoma                                STAGE:     Recurrent   CONCURRENT SYSTEMIC THERAPY:    None                 Details of the treatments summarized below are found in records kept in the Department of Radiation Oncology at [facility].     Treatment Summary:  Radiation Oncology - Course: 1 Protocol:   Treatment Site Current Dose Modality From To Elapsed Days Fx.  1 Pelvis  4,500 cGy 10 X  2/11/2019  3/15/2019  32 25  1 Brachy Boost  2,250 cGy   3/18/2019  3/20/2019   2  5          Dose per Fraction:  (Pelvis 180 cGy, Brachy boost 450 cGy) Total Dose:      6750 cGy        COMMENTS: Ms. Lilli Locke is a 58 year old woman with a history of endometrial carcinoma diagnosed   in . She underwent LELE/BSO, and bilateral lymph node dissection by Dr. Fraga on 2013.   Pathology revealed FIGO Grade 1 endometrial endometrioid adenocarcinoma, <50% myometrial invasion, no   LVSI, negative peritoneal cytology, negative surgical margins, and negative dissected lymph nodes. Thus   adjuvant therapy was not recommended.  In 2018, she presented with vaginal bleeding and pelvic pain. She   was then referred to Dr. Whitfield at [facility] on 18. Her pelvic exam showed irregular friable lesion at   the vaginal cuff measuring 3 x 2 cm. A punch biopsy was consistent with recurrent endometrial cancer. PET CT   showed irregular soft tissue thickening of the vaginal cuff (SUV max 20.4) with no evidence of   lymphadenopathy or metastatic disease. MRI of pelvis demonstrated 2.1 x 3.6 x 1.8 cm enhancing soft tissue   mass in the right vaginal cuff. She received salvage radiotherapy consisting of external beam radiotherapy 4500   cGy in 25 fractions to the pelvis followed  by vaginal cuff brachytherapy boost via interstitial implant to 2250   cGy in 5 fractions. She tolerated her treatment well without hospitalizations (except for required admission for   brachytherapy, breaks or interruptions. During her first part of treatment course, she experienced mild fatigue   particularly towards the end of her treatment. She decided to take some time off from her work. She had mild   pelvic pain which was well controlled by Percocet. She was initially severely constipated which gradually   transitioned to diarrhea at the end of treatment. She received Imodium which was then switched to Lomotil. Her   brachytherapy treatment course was uneventful.        ED visits/hospitalizations: None     Missed treatments: None      Acute Toxicity Profile by CTC v5.0:  Diarrhea: grade III  Fatigue: grade I     PAIN MANAGEMENT:    Percocet                           FOLLOW UP PLAN: 1- RTC in one month                                         2- Follow up with OBGYN, as scheduled                                                                  Resident Physician: Armando Betancur M.D.   Staff Physician: Elsy Elizabeth M.D.  Physicist: Ave Hernandez, PhD     CC: MD Crissy Allen MD                                     Radiation Oncology:  [facility] [address] [place] [address]

## 2019-04-11 ENCOUNTER — TELEPHONE (OUTPATIENT)
Dept: RADIATION ONCOLOGY | Facility: CLINIC | Age: 59
End: 2019-04-11

## 2019-04-11 NOTE — TELEPHONE ENCOUNTER
RN called pt to talk about side effects she is having since discharge from high dose brachytherapy, interstitial.  Pt continues to have daily nausea and vomiting as well as diarrhea. Zofran helps some with the nausea but she continues to vomit almost daily.  Pt also states she continues to have diarrhea sometimes it is a few times/day but can be hourly, foul smelling frothy yellow stool with cramping.  Pt is able to come in tomorrow 4/12/19 for a follow up and evaluation. Pt verbalizes understanding and Dr. Elizabeth away of above information.

## 2019-04-15 ENCOUNTER — TELEPHONE (OUTPATIENT)
Dept: RADIATION ONCOLOGY | Facility: CLINIC | Age: 59
End: 2019-04-15

## 2019-04-15 NOTE — TELEPHONE ENCOUNTER
Pt called and spoke with the  4/12/19. She stated she was feeling much better and would like to keep her appointment 4/19/19 and cancel her appointment for 4/12/19

## 2019-04-19 ENCOUNTER — OFFICE VISIT (OUTPATIENT)
Dept: RADIATION ONCOLOGY | Facility: CLINIC | Age: 59
End: 2019-04-19
Attending: RADIOLOGY
Payer: COMMERCIAL

## 2019-04-19 VITALS
BODY MASS INDEX: 49.97 KG/M2 | WEIGHT: 273.2 LBS | DIASTOLIC BLOOD PRESSURE: 59 MMHG | HEART RATE: 61 BPM | SYSTOLIC BLOOD PRESSURE: 135 MMHG | RESPIRATION RATE: 16 BRPM

## 2019-04-19 DIAGNOSIS — C54.1 ENDOMETRIAL CANCER (H): Primary | ICD-10-CM

## 2019-04-19 PROCEDURE — G0463 HOSPITAL OUTPT CLINIC VISIT: HCPCS | Performed by: RADIOLOGY

## 2019-04-19 NOTE — NURSING NOTE
FOLLOW-UP VISIT    Patient Name: Lilli Locke      : 1960     Age: 58 year old        ______________________________________________________________________________     Chief Complaint   Patient presents with     Cancer     Endometrial Cancer:Pelvis 4500 cGy plus brachytherapy completed 19     /59   Pulse 61   Resp 16   Wt 123.9 kg (273 lb 3.2 oz)   BMI 49.97 kg/m       Date Radiation Completed: 2019    Pain  Denies    Labs  Other Labs:     Imaging  None        Diarrhea: 0- None    Constipation: 0- None    Nausea: 0- None    Vomitin- No vomiting    Genitourinary system: 0- Normal    Dysuria: 0- None    Vaginal dilator use: not using, was not told    Other Appointments:     MD Name:  Appointment Date:    MD Name: Appointment Date:   MD Name: Appointment Date:   Other Appointment Notes:     Residual Radiation side effect:      Additional Instructions:     Nurse face-to-face time: Level 3:  10 min face to face time
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
## 2019-04-23 ENCOUNTER — ONCOLOGY VISIT (OUTPATIENT)
Dept: ONCOLOGY | Facility: CLINIC | Age: 59
End: 2019-04-23
Attending: OBSTETRICS & GYNECOLOGY
Payer: COMMERCIAL

## 2019-04-23 VITALS
HEART RATE: 60 BPM | RESPIRATION RATE: 16 BRPM | HEIGHT: 62 IN | OXYGEN SATURATION: 96 % | DIASTOLIC BLOOD PRESSURE: 62 MMHG | BODY MASS INDEX: 50.79 KG/M2 | SYSTOLIC BLOOD PRESSURE: 131 MMHG | WEIGHT: 276 LBS | TEMPERATURE: 97.8 F

## 2019-04-23 DIAGNOSIS — C54.1 ENDOMETRIAL CANCER (H): Primary | ICD-10-CM

## 2019-04-23 PROBLEM — H52.13 MYOPIA OF BOTH EYES WITH ASTIGMATISM AND PRESBYOPIA: Status: ACTIVE | Noted: 2017-01-24

## 2019-04-23 PROBLEM — H52.4 MYOPIA OF BOTH EYES WITH ASTIGMATISM AND PRESBYOPIA: Status: ACTIVE | Noted: 2017-01-24

## 2019-04-23 PROBLEM — M46.1 SACROILIITIS (H): Status: ACTIVE | Noted: 2017-02-15

## 2019-04-23 PROBLEM — H52.203 MYOPIA OF BOTH EYES WITH ASTIGMATISM AND PRESBYOPIA: Status: ACTIVE | Noted: 2017-01-24

## 2019-04-23 PROCEDURE — G0463 HOSPITAL OUTPT CLINIC VISIT: HCPCS | Mod: ZF

## 2019-04-23 PROCEDURE — 99214 OFFICE O/P EST MOD 30 MIN: CPT | Mod: ZP | Performed by: OBSTETRICS & GYNECOLOGY

## 2019-04-23 ASSESSMENT — MIFFLIN-ST. JEOR: SCORE: 1785.18

## 2019-04-23 ASSESSMENT — PAIN SCALES - GENERAL: PAINLEVEL: NO PAIN (0)

## 2019-04-23 NOTE — LETTER
[facility]  [address]  [address]  [facility] [address]  [phone]          April 23, 2019    RE:  Lilli Locke                                                                                                                                                       [address] [place] [address]            To whom it may concern:    Lilli Locke is under my professional care on 4/23/2019. She  may return to work with no restrictions or limitations.        Sincerely,        Crissy Fraga MD
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
## 2019-04-23 NOTE — NURSING NOTE
"Oncology Rooming Note    April 23, 2019 9:19 AM   Lilli Locke is a 58 year old female who presents for:    Chief Complaint   Patient presents with     Oncology Clinic Visit     Return Endometrial Ca     Initial Vitals: /62   Pulse 60   Temp 97.8  F (36.6  C) (Oral)   Resp 16   Ht 1.575 m (5' 2\")   Wt 125.2 kg (276 lb)   SpO2 96%   BMI 50.48 kg/m   Estimated body mass index is 50.48 kg/m  as calculated from the following:    Height as of this encounter: 1.575 m (5' 2\").    Weight as of this encounter: 125.2 kg (276 lb). Body surface area is 2.34 meters squared.  No Pain (0) Comment: Data Unavailable   No LMP recorded. Patient has had a hysterectomy.  Allergies reviewed: Yes  Medications reviewed: Yes    Medications: Medication refills not needed today.  Pharmacy name entered into [employer]: [id number] - [place], [place] - [address]    Clinical concerns: No new concerns.         Nataly Ernandez CMA              "

## 2019-04-23 NOTE — PATIENT INSTRUCTIONS
PET scan in mid June.  May need MRI depending on PET scan    Return visit in June after PET    Crissy Fraga MD  Gynecologic Oncology  [facility]          Breast Cancer Screening: During our visit today, we discussed that it is recommended you receive breast cancer screening. Please call or make an appointment with your primary care provider to discuss this with them. You may also call the [facility] scheduling line ([phone]) to set up a mammography appointment at the Breast [facility] within the [facility].
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
## 2019-06-21 ENCOUNTER — ANCILLARY PROCEDURE (OUTPATIENT)
Dept: PET IMAGING | Facility: CLINIC | Age: 59
End: 2019-06-21
Attending: OBSTETRICS & GYNECOLOGY
Payer: COMMERCIAL

## 2019-06-21 DIAGNOSIS — C54.1 ENDOMETRIAL CANCER (H): ICD-10-CM

## 2019-06-21 LAB — GLUCOSE SERPL-MCNC: 69 MG/DL (ref 70–99)

## 2019-06-21 RX ORDER — FUROSEMIDE 10 MG/ML
40 INJECTION INTRAMUSCULAR; INTRAVENOUS ONCE
Status: COMPLETED | OUTPATIENT
Start: 2019-06-21 | End: 2019-06-21

## 2019-06-21 RX ADMIN — FUROSEMIDE 40 MG: 10 INJECTION INTRAMUSCULAR; INTRAVENOUS at 10:15

## 2019-06-21 NOTE — DISCHARGE INSTRUCTIONS

## 2019-06-25 ENCOUNTER — ONCOLOGY VISIT (OUTPATIENT)
Dept: ONCOLOGY | Facility: CLINIC | Age: 59
End: 2019-06-25
Attending: OBSTETRICS & GYNECOLOGY
Payer: COMMERCIAL

## 2019-06-25 VITALS
DIASTOLIC BLOOD PRESSURE: 66 MMHG | HEART RATE: 98 BPM | BODY MASS INDEX: 52.02 KG/M2 | WEIGHT: 282.7 LBS | HEIGHT: 62 IN | OXYGEN SATURATION: 92 % | SYSTOLIC BLOOD PRESSURE: 114 MMHG | TEMPERATURE: 98.2 F | RESPIRATION RATE: 16 BRPM

## 2019-06-25 DIAGNOSIS — C54.1 ENDOMETRIAL CANCER (H): Primary | ICD-10-CM

## 2019-06-25 PROCEDURE — G0463 HOSPITAL OUTPT CLINIC VISIT: HCPCS | Mod: ZF

## 2019-06-25 PROCEDURE — 99214 OFFICE O/P EST MOD 30 MIN: CPT | Mod: ZP | Performed by: OBSTETRICS & GYNECOLOGY

## 2019-06-25 RX ORDER — CETIRIZINE HYDROCHLORIDE 10 MG/1
10 TABLET ORAL DAILY
Refills: 6 | COMMUNITY
Start: 2019-05-30

## 2019-06-25 RX ORDER — BLOOD SUGAR DIAGNOSTIC
STRIP MISCELLANEOUS
Refills: 11 | COMMUNITY
Start: 2019-05-31

## 2019-06-25 ASSESSMENT — PAIN SCALES - GENERAL: PAINLEVEL: NO PAIN (0)

## 2019-06-25 ASSESSMENT — MIFFLIN-ST. JEOR: SCORE: 1815.7

## 2019-06-25 NOTE — NURSING NOTE
"Oncology Rooming Note    June 25, 2019 2:26 PM   Lilli Locke is a 58 year old female who presents for:    Chief Complaint   Patient presents with     Oncology Clinic Visit     F/U Endometrail Ca     Initial Vitals: /66 (BP Location: Left arm, Patient Position: Sitting, Cuff Size: Adult Regular)   Pulse 98   Temp 98.2  F (36.8  C) (Oral)   Resp 16   Ht 1.575 m (5' 2.01\")   Wt 128.2 kg (282 lb 11.2 oz)   SpO2 92%   BMI 51.69 kg/m   Estimated body mass index is 51.69 kg/m  as calculated from the following:    Height as of this encounter: 1.575 m (5' 2.01\").    Weight as of this encounter: 128.2 kg (282 lb 11.2 oz). Body surface area is 2.37 meters squared.  No Pain (0) Comment: Data Unavailable   No LMP recorded. Patient has had a hysterectomy.  Allergies reviewed: Yes  Medications reviewed: Yes    Medications: Medication refills not needed today.  Pharmacy name entered into Who[facility]: [id number] - [place], [place] - [address]    Clinical concerns: None, Dr Fraga was NOT notified.      GILL CALERO LPN            "

## 2019-06-25 NOTE — LETTER
6/25/2019       RE: Lilli Locke  [address]  [place] [address]     Dear Colleague,    Thank you for referring your patient, Lilli Locke, to the [facility]. Please see a copy of my visit note below.                            Consult Notes on Referred Patient    Date: 6/25/2019     The patient returns today for follow-up.    Her history is as follows:       Patient returns today for follow-up in reference to her recurrent endometrial cancer        Her history is as follows:     She is status post diagnostic laparoscopy converted to exploratory laparotomy, LELE, BSO, BPLND, omentectomy, cysto on 8-. Final pathology showed a FIGO Grade 1 endometrioid SUE, 6.5cm greatest dimension, <50% myometrial invasion (0.5/3.2cm), no LVI, negative cytology, negative margins, negative nodes. Omentum, adnexa negative.      Patient's postoperative course was complicated by readmission with PE (prior history of PE, discharged on home rivaroxaban) and nausea and vomiting.      The patient had limited follow-up. Was seen in Gyn Onc clinic at [facility] in 9/2014 and most recently, in 4/2016, with recs for six month follow-up.    The patient sought care in 11/2017 for vaginal bleeding and was seen by a local Ob/Gyn. Exam was negative. However, since that time, she reportedly has been bleeding regularly. She had a CT 11/18 which was negative, US 11/18 also negative. She had a pap smear by her PCP showing AGC worrisome for malignancy. She was then seen by another Ob/Gyn [facility] on 12/17/18. Exam notable for lesion at vaginal cuff which was biopsied. Path showed invasive SUE consistent with endometrial origin. PET on 12/29/18 showed soft tissue thickening at vaginal cuff concerning for recurrence with loss of fat plane at anterior wall rectum and posterior wall bladder. No metastatic disease.      1/18/2019:  Seen by me at [facility].  Exam notable for large mass at top of cuff.  Discussed  referral to Rad Onc at [facility] for radiation therapy and MRI.     19:  MRI pelvis:  2.1 x 3.6 x 1.8 cm enhancing soft tissue mass in the right vaginal cuff with posterior extension into the anterior wall of the rectum. No evidence of bladder invasion, pelvic lymphadenopathy or metastatic disease.     19:  Seen by Rad Onc with plans for EBRT, vaginal cuff brachytherapy via interstitial implant     19 to 3/20/19:  EBRT pelvis, vaginal cuff brachytherapy via interstitial implant     19:  PET scan: No hypermetabolic activity or irregular vaginal wall thickening to suggest residual disease.  Indeterminate 3mm solid pulmonary nodule, attention on follow-up.    The patient returns today for follow-up.  Doing well, energy improving, no vaginal bleeding, using dilator.  No new back pain, no LE swelling, no difficulty with bowel or bladder function.          Past Medical History:    Past Medical History:   Diagnosis Date     Antiplatelet or antithrombotic long-term use     on Xarelto since  for unprovoked PE     Complication of anesthesia     PONV following hysterectomy     Diabetes mellitus type 2, insulin dependent (H)     on insulin & metformin     Diabetic neuropathy, type II diabetes mellitus (H)     hands & feet     Diastolic CHF, acute on chronic (H) 10/20/2012     Dyspnea on exertion      Endometrial cancer (H)      GERD (gastroesophageal reflux disease)     on omeprazole     Hx of pulmonary embolus      LVH (left ventricular hypertrophy) due to hypertensive disease 10/20/2012     Mixed hyperlipidemia      Obesity     BMI 55     Obstructive sleep apnea     has CPAP     Sacroiliitis (H) 2/15/2017     Substance abuse (H)      Thyroid nodule 2015     Uncomplicated asthma     Uses albuterol inhaler 1-2x/yr mostly during summer months         Past Surgical History:    Past Surgical History:   Procedure Laterality Date      SECTION      x2     DENTAL SURGERY      wisdom teeth  extraction     HYSTERECTOMY TOTAL ABDOMINAL, BILATERAL SALPINGO-OOPHORECTOMY, COMBINED  2013    cancer     INSERT INTERSTITIAL NEEDLE, ULTRASOUND GUIDED N/A 3/18/2019    Procedure: Exam Under Anesthesia, Insert Interstitial Needles With Ultrasound Guidance;  Surgeon: Elsy Elizabeth MD;  [facility]     LAPAROSCOPY DIAGNOSTIC (GYN) N/A 3/18/2019    Procedure: Diagnostic Laparoscopy **Latex Allergy**;  Surgeon: Yohana Lau MD;  [facility]     REMOVE INTERSTITIAL NEEDLE N/A 3/20/2019    Procedure: Removal Of Interstitial [place];  Surgeon: Elsy Elizabeth MD;  Location[facility]     TUBAL LIGATION           Health Maintenance:  Health Maintenance Due   Topic Date Due     PREVENTIVE CARE VISIT  1960     ALT  1960     HF ACTION PLAN  1960     LIPID  1960     MICROALBUMIN  1960     TSH W/FREE T4 REFLEX  1960     DIABETIC FOOT EXAM  1960     HEPATITIS C SCREENING  1960     ADVANCE CARE PLANNING  1960     DEPRESSION ACTION PLAN  1960     EYE EXAM  1960     MAMMO SCREENING  1960     HIV SCREENING  11/11/1975     ZOSTER IMMUNIZATION (1 of 2) 11/11/2010       Current Medications:     has a current medication list which includes the following prescription(s): acetaminophen, albuterol, albuterol, atorvastatin, blood glucose, docusate sodium, fluoxetine, fluticasone, gabapentin, insulin lispro prot & lispro, metformin hcl, omeprazole, UNABLE TO FIND, xarelto, accu-chek denys plus, and cetirizine.       Allergies:     [unfilled]        Social History:     Social History     Tobacco Use     Smoking status: Never Smoker     Smokeless tobacco: Never Used   Substance Use Topics     Alcohol use: No     Alcohol/week: 0.0 oz     Frequency: Never       History   Drug Use No           Family History:     The patient's family history is notable fo:    Family History   Problem Relation Age of Onset     Cancer Mother         ovarian     Cancer  "Maternal Grandmother         Breast      Deep Vein Thrombosis Granddaughter         dvt following birth of baby     Cardiac Sudden Death No family hx of      Myocardial Infarction No family hx of      Anesthesia Reaction No family hx of          Physical Exam:     /66 (BP Location: Left arm, Patient Position: Sitting, Cuff Size: Adult Regular)   Pulse 98   Temp 98.2  F (36.8  C) (Oral)   Resp 16   Ht 1.575 m (5' 2.01\")   Wt 128.2 kg (282 lb 11.2 oz)   SpO2 92%   BMI 51.69 kg/m     Body mass index is 51.69 kg/m .    General Appearance: healthy and alert, no distress     Musculoskeletal: extremities non tender and without edema    Skin: no lesions or rashes     Neurological: normal gait, no gross defects     Psychiatric: appropriate mood and affect                               Hematological: normal cervical, supraclavicular and inguinal lymph nodes     Gastrointestinal:       abdomen soft, non-tender, non-distended, no organomegaly or masses    Genitourinary: External genitalia and urethral meatus appears normal.  Vagina fibrotic, pale from radiation changes, scarred at apex, no gross lesions or fistula.        Assessment:    Lilli is a 58 y.o. old woman with a vaginal cuff/pelvic recurrence of her endometrial cancer    A total of 30 minutes was spent with the patient, 25 minutes of which were spent in counseling the patient and/or treatment planning.    Plan:     1.) Vaginal cuff/pelvic recurrence of her endometrial cancer: Has completed radiation therapy, tolerated well.     PET scan reviewed, KEEGAN.  Monitor closely.  Repeat in six months and then prn         2.) Vaginal dilator: Continue use    3.)  Pulmonary nodule:  Continue close follow-up.  Repeat PET six months        Crissy Fraga MD  Gynecologic Oncology  [facility]  CC  Patient Care Team:  José Le MD as PCP - General (Internal Medicine)  SELF, REFERRED      "

## 2019-06-25 NOTE — PATIENT INSTRUCTIONS
PET negative    Use vaginal dilator    Return visit in three months for follow-up exam    Crissy Fraga MD  Gynecologic Oncology  [facility]

## 2019-07-24 NOTE — PROGRESS NOTES
Consult Notes on Referred Patient    Date: 6/25/2019     The patient returns today for follow-up.    Her history is as follows:       Patient returns today for follow-up in reference to her recurrent endometrial cancer        Her history is as follows:     She is status post diagnostic laparoscopy converted to exploratory laparotomy, LELE, BSO, BPLND, omentectomy, cysto on 8-. Final pathology showed a FIGO Grade 1 endometrioid SUE, 6.5cm greatest dimension, <50% myometrial invasion (0.5/3.2cm), no LVI, negative cytology, negative margins, negative nodes. Omentum, adnexa negative.      Patient's postoperative course was complicated by readmission with PE (prior history of PE, discharged on home rivaroxaban) and nausea and vomiting.      The patient had limited follow-up. Was seen in Gyn Onc clinic at [facility] in 9/2014 and most recently, in 4/2016, with recs for six month follow-up.    The patient sought care in 11/2017 for vaginal bleeding and was seen by a local Ob/Gyn. Exam was negative. However, since that time, she reportedly has been bleeding regularly. She had a CT 11/18 which was negative, US 11/18 also negative. She had a pap smear by her PCP showing AGC worrisome for malignancy. She was then seen by another Ob/Gyn at [facility] on 12/17/18. Exam notable for lesion at vaginal cuff which was biopsied. Path showed invasive SUE consistent with endometrial origin. PET on 12/29/18 showed soft tissue thickening at vaginal cuff concerning for recurrence with loss of fat plane at anterior wall rectum and posterior wall bladder. No metastatic disease.      1/18/2019:  Seen by me at [facility].  Exam notable for large mass at top of cuff.  Discussed referral to Rad Onc at [facility] for radiation therapy and MRI.     1/25/19:  MRI pelvis:  2.1 x 3.6 x 1.8 cm enhancing soft tissue mass in the right vaginal cuff with posterior extension into the anterior wall of the rectum. No evidence of bladder invasion,  pelvic lymphadenopathy or metastatic disease.     19:  Seen by Rad Onc with plans for EBRT, vaginal cuff brachytherapy via interstitial implant     19 to 3/20/19:  EBRT pelvis, vaginal cuff brachytherapy via interstitial implant     19:  PET scan: No hypermetabolic activity or irregular vaginal wall thickening to suggest residual disease.  Indeterminate 3mm solid pulmonary nodule, attention on follow-up.    The patient returns today for follow-up.  Doing well, energy improving, no vaginal bleeding, using dilator.  No new back pain, no LE swelling, no difficulty with bowel or bladder function.          Past Medical History:    Past Medical History:   Diagnosis Date     Antiplatelet or antithrombotic long-term use     on Xarelto since  for unprovoked PE     Complication of anesthesia     PONV following hysterectomy     Diabetes mellitus type 2, insulin dependent (H)     on insulin & metformin     Diabetic neuropathy, type II diabetes mellitus (H)     hands & feet     Diastolic CHF, acute on chronic (H) 10/20/2012     Dyspnea on exertion      Endometrial cancer (H)      GERD (gastroesophageal reflux disease)     on omeprazole     Hx of pulmonary embolus      LVH (left ventricular hypertrophy) due to hypertensive disease 10/20/2012     Mixed hyperlipidemia      Obesity     BMI 55     Obstructive sleep apnea     has CPAP     Sacroiliitis (H) 2/15/2017     Substance abuse (H)      Thyroid nodule 2015     Uncomplicated asthma     Uses albuterol inhaler 1-2x/yr mostly during summer months         Past Surgical History:    Past Surgical History:   Procedure Laterality Date      SECTION      x2     DENTAL SURGERY      wisdom teeth extraction     HYSTERECTOMY TOTAL ABDOMINAL, BILATERAL SALPINGO-OOPHORECTOMY, COMBINED      cancer     INSERT INTERSTITIAL NEEDLE, ULTRASOUND GUIDED N/A 3/18/2019    Procedure: Exam Under Anesthesia, Insert Interstitial Needles With Ultrasound Guidance;   Surgeon: Elsy Elizabeth MD;  [facility]     LAPAROSCOPY DIAGNOSTIC (GYN) N/A 3/18/2019    Procedure: Diagnostic Laparoscopy **Latex Allergy**;  Surgeon: Yohana Lau MD;  [facility]     REMOVE INTERSTITIAL NEEDLE N/A 3/20/2019    Procedure: Removal Of Interstitial [place];  Surgeon: Elsy Elizabeth MD;  Location: [facility]     TUBAL LIGATION           Health Maintenance:  Health Maintenance Due   Topic Date Due     PREVENTIVE CARE VISIT  1960     ALT  1960     HF ACTION PLAN  1960     LIPID  1960     MICROALBUMIN  1960     TSH W/FREE T4 REFLEX  1960     DIABETIC FOOT EXAM  1960     HEPATITIS C SCREENING  1960     ADVANCE CARE PLANNING  1960     DEPRESSION ACTION PLAN  1960     EYE EXAM  1960     MAMMO SCREENING  1960     HIV SCREENING  11/11/1975     ZOSTER IMMUNIZATION (1 of 2) 11/11/2010       Current Medications:     has a current medication list which includes the following prescription(s): acetaminophen, albuterol, albuterol, atorvastatin, blood glucose, docusate sodium, fluoxetine, fluticasone, gabapentin, insulin lispro prot & lispro, metformin hcl, omeprazole, UNABLE TO FIND, xarelto, accu-chek denys plus, and cetirizine.       Allergies:     [unfilled]        Social History:     Social History     Tobacco Use     Smoking status: Never Smoker     Smokeless tobacco: Never Used   Substance Use Topics     Alcohol use: No     Alcohol/week: 0.0 oz     Frequency: Never       History   Drug Use No           Family History:     The patient's family history is notable fo:    Family History   Problem Relation Age of Onset     Cancer Mother         ovarian     Cancer Maternal Grandmother         Breast      Deep Vein Thrombosis Granddaughter         dvt following birth of baby     Cardiac Sudden Death No family hx of      Myocardial Infarction No family hx of      Anesthesia Reaction No family hx of          Physical Exam:  "    /66 (BP Location: Left arm, Patient Position: Sitting, Cuff Size: Adult Regular)   Pulse 98   Temp 98.2  F (36.8  C) (Oral)   Resp 16   Ht 1.575 m (5' 2.01\")   Wt 128.2 kg (282 lb 11.2 oz)   SpO2 92%   BMI 51.69 kg/m    Body mass index is 51.69 kg/m .    General Appearance: healthy and alert, no distress     Musculoskeletal: extremities non tender and without edema    Skin: no lesions or rashes     Neurological: normal gait, no gross defects     Psychiatric: appropriate mood and affect                               Hematological: normal cervical, supraclavicular and inguinal lymph nodes     Gastrointestinal:       abdomen soft, non-tender, non-distended, no organomegaly or masses    Genitourinary: External genitalia and urethral meatus appears normal.  Vagina fibrotic, pale from radiation changes, scarred at apex, no gross lesions or fistula.        Assessment:    Lilli is a 58 y.o. old woman with a vaginal cuff/pelvic recurrence of her endometrial cancer    A total of 30 minutes was spent with the patient, 25 minutes of which were spent in counseling the patient and/or treatment planning.    Plan:     1.) Vaginal cuff/pelvic recurrence of her endometrial cancer: Has completed radiation therapy, tolerated well.     PET scan reviewed, KEEGAN.  Monitor closely.  Repeat in six months and then prn         2.) Vaginal dilator: Continue use    3.)  Pulmonary nodule:  Continue close follow-up.  Repeat PET six months        Crissy Fraga MD  Gynecologic Oncology  [facility]  CC  Patient Care Team:  José Le MD as PCP - General (Internal Medicine)  SELF, REFERRED    "

## 2019-09-24 ENCOUNTER — ONCOLOGY VISIT (OUTPATIENT)
Dept: ONCOLOGY | Facility: CLINIC | Age: 59
End: 2019-09-24
Attending: OBSTETRICS & GYNECOLOGY
Payer: COMMERCIAL

## 2019-09-24 VITALS
OXYGEN SATURATION: 95 % | WEIGHT: 271 LBS | RESPIRATION RATE: 16 BRPM | DIASTOLIC BLOOD PRESSURE: 54 MMHG | BODY MASS INDEX: 49.55 KG/M2 | HEART RATE: 77 BPM | TEMPERATURE: 97.8 F | SYSTOLIC BLOOD PRESSURE: 109 MMHG

## 2019-09-24 DIAGNOSIS — C54.1 ENDOMETRIAL CANCER (H): Primary | ICD-10-CM

## 2019-09-24 PROBLEM — H10.13 ALLERGIC CONJUNCTIVITIS OF BOTH EYES: Status: ACTIVE | Noted: 2019-06-07

## 2019-09-24 PROBLEM — E11.65 TYPE 2 DIABETES MELLITUS WITH HYPERGLYCEMIA (H): Status: ACTIVE | Noted: 2019-09-24

## 2019-09-24 PROCEDURE — 99214 OFFICE O/P EST MOD 30 MIN: CPT | Mod: ZP | Performed by: OBSTETRICS & GYNECOLOGY

## 2019-09-24 PROCEDURE — G0463 HOSPITAL OUTPT CLINIC VISIT: HCPCS | Mod: ZF

## 2019-09-24 ASSESSMENT — PAIN SCALES - GENERAL: PAINLEVEL: NO PAIN (0)

## 2019-09-24 NOTE — LETTER
9/24/2019       RE: Lilli Locke  [address]  [place] [address]     Dear Colleague,    Thank you for referring your patient, Lilli Locke, to the [facility]. Please see a copy of my visit note below.                            Consult Notes on Referred Patient    Date: 9/24/2019     Patient returns today for follow-up in reference to her recurrent endometrial cancer        Her history is as follows:     She is status post diagnostic laparoscopy converted to exploratory laparotomy, LELE, BSO, BPLND, omentectomy, cysto on 8-. Final pathology showed a FIGO Grade 1 endometrioid SUE, 6.5cm greatest dimension, <50% myometrial invasion (0.5/3.2cm), no LVI, negative cytology, negative margins, negative nodes. Omentum, adnexa negative.      Patient's postoperative course was complicated by readmission with PE (prior history of PE, discharged on home rivaroxaban) and nausea and vomiting.      The patient had limited follow-up. Was seen in Gyn Onc clinic at [facility] in 9/2014 and most recently, in 4/2016, with recs for six month follow-up.    The patient sought care in 11/2017 for vaginal bleeding and was seen by a local Ob/Gyn. Exam was negative. However, since that time, she reportedly has been bleeding regularly. She had a CT 11/18 which was negative, US 11/18 also negative. She had a pap smear by her PCP showing AGC worrisome for malignancy. She was then seen by another Ob/Gyn at [facility] on 12/17/18. Exam notable for lesion at vaginal cuff which was biopsied. Path showed invasive SUE consistent with endometrial origin. PET on 12/29/18 showed soft tissue thickening at vaginal cuff concerning for recurrence with loss of fat plane at anterior wall rectum and posterior wall bladder. No metastatic disease.      1/18/2019:  Seen by me at [facility].  Exam notable for large mass at top of cuff.  Discussed referral to Rad Onc at [facility] for radiation therapy and MRI.     1/25/19:  MRI  pelvis:  2.1 x 3.6 x 1.8 cm enhancing soft tissue mass in the right vaginal cuff with posterior extension into the anterior wall of the rectum. No evidence of bladder invasion, pelvic lymphadenopathy or metastatic disease.     19:  Seen by Rad Onc with plans for EBRT, vaginal cuff brachytherapy via interstitial implant     19 to 3/20/19:  EBRT pelvis, vaginal cuff brachytherapy via interstitial implant     19:  PET scan: No hypermetabolic activity or irregular vaginal wall thickening to suggest residual disease.  Indeterminate 3mm solid pulmonary nodule, attention on follow-up.     The patient returns today for routine surveillance visit.  She is doing well, no new symptoms.  Trying to use dilator.  No vaginal bleeding, no changes in bowel or bladder function.  No new pain symptoms.       Past Medical History:    Past Medical History:   Diagnosis Date     Antiplatelet or antithrombotic long-term use     on Xarelto since  for unprovoked PE     Complication of anesthesia     PONV following hysterectomy     Diabetes mellitus type 2, insulin dependent (H)     on insulin & metformin     Diabetic neuropathy, type II diabetes mellitus (H)     hands & feet     Diastolic CHF, acute on chronic (H) 10/20/2012     Dyspnea on exertion      Endometrial cancer (H)      GERD (gastroesophageal reflux disease)     on omeprazole     Hx of pulmonary embolus      LVH (left ventricular hypertrophy) due to hypertensive disease 10/20/2012     Mixed hyperlipidemia      Obesity     BMI 55     Obstructive sleep apnea     has CPAP     Sacroiliitis (H) 2/15/2017     Substance abuse (H)      Thyroid nodule 2015     Uncomplicated asthma     Uses albuterol inhaler 1-2x/yr mostly during summer months         Past Surgical History:    Past Surgical History:   Procedure Laterality Date      SECTION      x2     DENTAL SURGERY      wisdom teeth extraction     HYSTERECTOMY TOTAL ABDOMINAL, BILATERAL  SALPINGO-OOPHORECTOMY, COMBINED  2013    cancer     INSERT INTERSTITIAL NEEDLE, ULTRASOUND GUIDED N/A 3/18/2019    Procedure: Exam Under Anesthesia, Insert Interstitial Needles With Ultrasound Guidance;  Surgeon: Elsy Elizabeth MD;  Location: [facility]     LAPAROSCOPY DIAGNOSTIC (GYN) N/A 3/18/2019    Procedure: Diagnostic Laparoscopy **Latex Allergy**;  Surgeon: Yohana Lau MD;  Location: [facility] OR     REMOVE INTERSTITIAL NEEDLE N/A 3/20/2019    Procedure: Removal Of Interstitial [place];  Surgeon: Elsy Elizabeth MD;  Location: [facility] OR     TUBAL LIGATION           Health Maintenance:  Health Maintenance Due   Topic Date Due     PREVENTIVE CARE VISIT  1960     ALT  1960     HF ACTION PLAN  1960     LIPID  1960     MICROALBUMIN  1960     TSH W/FREE T4 REFLEX  1960     DIABETIC FOOT EXAM  1960     HEPATITIS C SCREENING  1960     ADVANCE CARE PLANNING  1960     DEPRESSION ACTION PLAN  1960     EYE EXAM  1960     MAMMO SCREENING  1960     HIV SCREENING  11/11/1975     HPV  11/11/1981     ZOSTER IMMUNIZATION (1 of 2) 11/11/2010     PNEUMOCOCCAL IMMUNIZATION 19-64 HIGHEST RISK (3 of 3 - PCV13) 06/22/2011     INFLUENZA VACCINE (1) 09/01/2019     A1C  09/07/2019     BMP  09/07/2019     PHQ-9  09/07/2019         Current Medications:     has a current medication list which includes the following prescription(s): acetaminophen, albuterol, albuterol, atorvastatin, cetirizine, docusate sodium, fluoxetine, fluticasone, gabapentin, insulin lispro prot & lispro, metformin hcl, omeprazole, xarelto anticoagulant, accu-chek denys plus, and blood glucose.       Allergies:     [unfilled]        Social History:     Social History     Tobacco Use     Smoking status: Never Smoker     Smokeless tobacco: Never Used   Substance Use Topics     Alcohol use: No     Alcohol/week: 0.0 standard drinks     Frequency: Never       History   Drug Use No           Family  History:     The patient's family history is notable for :    Family History   Problem Relation Age of Onset     Cancer Mother         ovarian     Cancer Maternal Grandmother         Breast      Deep Vein Thrombosis Granddaughter         dvt following birth of baby     Cardiac Sudden Death No family hx of      Myocardial Infarction No family hx of      Anesthesia Reaction No family hx of          Physical Exam:     /54   Pulse 77   Temp 97.8  F (36.6  C) (Oral)   Resp 16   Wt 122.9 kg (271 lb)   SpO2 95%   BMI 49.55 kg/m     Body mass index is 49.55 kg/m .    General Appearance: healthy and alert, no distress     Musculoskeletal: extremities non tender and without edema    Skin: no lesions or rashes     Neurological: normal gait, no gross defects     Psychiatric: appropriate mood and affect                               Hematological: normal cervical, supraclavicular and inguinal lymph nodes     Gastrointestinal:       abdomen soft, non-tender, non-distended, no organomegaly or masses    Genitourinary: External genitalia and urethral meatus appears normal.  Vagina with post radiation changes.  Fixation at apex and anteriorly, mild scarring, no obvious signs of recurrence.          Assessment:    Lilli is a 58 y.o. old woman with a vaginal cuff/pelvic recurrence of her endometrial cancer    A total of 30 minutes was spent with the patient, 25 minutes of which were spent in counseling the patient and/or treatment planning.    Plan:     1.) Vaginal cuff/pelvic recurrence of her endometrial cancer: Has completed radiation therapy, tolerated well.     No obvious signs of recurrence on exam. Monitor closely.  Repeat in three months and then prn         2.) Vaginal dilator: Continue use     3.)  Pulmonary nodule:  Continue close follow-up.  Repeat PET three months         Crissy Fraga MD  Gynecologic Oncology  [facility]    CC  Patient Care Team:  José Le MD as PCP -  General (Internal Medicine)

## 2019-09-24 NOTE — PATIENT INSTRUCTIONS
Exam stable  Return in three months with PET scan    Crissy Fraga MD  Gynecologic Oncology  [facility]        Breast Cancer Screening: During our visit today, we discussed that it is recommended you receive breast cancer screening. Please call or make an appointment with your primary care provider to discuss this with them. You may also call the [facility] scheduling line ([phone]) to set up a mammography appointment at the [facility] within the [facility].

## 2019-09-24 NOTE — NURSING NOTE
"Oncology Rooming Note    September 24, 2019 1:53 PM   Lilli Locke is a 58 year old female who presents for:    Chief Complaint   Patient presents with     Oncology Clinic Visit     Return Endometrial Ca     Initial Vitals: /54   Pulse 77   Temp 97.8  F (36.6  C) (Oral)   Resp 16   Wt 122.9 kg (271 lb)   SpO2 95%   BMI 49.55 kg/m   Estimated body mass index is 49.55 kg/m  as calculated from the following:    Height as of 6/25/19: 1.575 m (5' 2.01\").    Weight as of this encounter: 122.9 kg (271 lb). Body surface area is 2.32 meters squared.  No Pain (0) Comment: Data Unavailable   No LMP recorded. Patient has had a hysterectomy.  Allergies reviewed: Yes  Medications reviewed: Yes    Medications: Medication refills not needed today.  Pharmacy name entered into [employer]: [id number] - [place], [place] - [address]    Clinical concerns: No new concerns.         Nataly Ernandez CMA              "

## 2019-10-23 NOTE — PROGRESS NOTES
Consult Notes on Referred Patient    Date: 9/24/2019     Patient returns today for follow-up in reference to her recurrent endometrial cancer        Her history is as follows:     She is status post diagnostic laparoscopy converted to exploratory laparotomy, LELE, BSO, BPLND, omentectomy, cysto on 8-. Final pathology showed a FIGO Grade 1 endometrioid SUE, 6.5cm greatest dimension, <50% myometrial invasion (0.5/3.2cm), no LVI, negative cytology, negative margins, negative nodes. Omentum, adnexa negative.      Patient's postoperative course was complicated by readmission with PE (prior history of PE, discharged on home rivaroxaban) and nausea and vomiting.      The patient had limited follow-up. Was seen in Gyn Onc clinic at [facility] in 9/2014 and most recently, in 4/2016, with recs for six month follow-up.    The patient sought care in 11/2017 for vaginal bleeding and was seen by a local Ob/Gyn. Exam was negative. However, since that time, she reportedly has been bleeding regularly. She had a CT 11/18 which was negative, US 11/18 also negative. She had a pap smear by her PCP showing AGC worrisome for malignancy. She was then seen by another Ob[place]Gyn [place] [facility] [place]. Exam notable for lesion at vaginal cuff which was biopsied. Path showed invasive SUE consistent with endometrial origin. PET on 12/29/18 showed soft tissue thickening at vaginal cuff concerning for recurrence with loss of fat plane at anterior wall rectum and posterior wall bladder. No metastatic disease.      1/18/2019:  Seen by me at [facility].  Exam notable for large mass at top of cuff.  Discussed referral to Rad Onc at [facility] for radiation therapy and MRI.     1/25/19:  MRI pelvis:  2.1 x 3.6 x 1.8 cm enhancing soft tissue mass in the right vaginal cuff with posterior extension into the anterior wall of the rectum. No evidence of bladder invasion, pelvic lymphadenopathy or metastatic disease.     1/30/19:  Seen by Rad Onc  with plans for EBRT, vaginal cuff brachytherapy via interstitial implant     19 to 3/20/19:  EBRT pelvis, vaginal cuff brachytherapy via interstitial implant     19:  PET scan: No hypermetabolic activity or irregular vaginal wall thickening to suggest residual disease.  Indeterminate 3mm solid pulmonary nodule, attention on follow-up.     The patient returns today for routine surveillance visit.  She is doing well, no new symptoms.  Trying to use dilator.  No vaginal bleeding, no changes in bowel or bladder function.  No new pain symptoms.       Past Medical History:    Past Medical History:   Diagnosis Date     Antiplatelet or antithrombotic long-term use     on Xarelto since  for unprovoked PE     Complication of anesthesia 2013    PONV following hysterectomy     Diabetes mellitus type 2, insulin dependent (H)     on insulin & metformin     Diabetic neuropathy, type II diabetes mellitus (H)     hands & feet     Diastolic CHF, acute on chronic (H) 10/20/2012     Dyspnea on exertion      Endometrial cancer (H)      GERD (gastroesophageal reflux disease)     on omeprazole     Hx of pulmonary embolus      LVH (left ventricular hypertrophy) due to hypertensive disease 10/20/2012     Mixed hyperlipidemia      Obesity     BMI 55     Obstructive sleep apnea     has CPAP     Sacroiliitis (H) 2/15/2017     Substance abuse (H)      Thyroid nodule 2015     Uncomplicated asthma     Uses albuterol inhaler 1-2x/yr mostly during summer months         Past Surgical History:    Past Surgical History:   Procedure Laterality Date      SECTION      x2     DENTAL SURGERY      wisdom teeth extraction     HYSTERECTOMY TOTAL ABDOMINAL, BILATERAL SALPINGO-OOPHORECTOMY, COMBINED      cancer     INSERT INTERSTITIAL NEEDLE, ULTRASOUND GUIDED N/A 3/18/2019    Procedure: Exam Under Anesthesia, Insert Interstitial Needles With Ultrasound Guidance;  Surgeon: Elsy Elizabeth MD;  Location: [facility] OR     LAPAROSCOPY  DIAGNOSTIC (GYN) N/A 3/18/2019    Procedure: Diagnostic Laparoscopy **Latex Allergy**;  Surgeon: Yohana Lau MD;  Location: [facility] OR     REMOVE INTERSTITIAL NEEDLE N/A 3/20/2019    Procedure: Removal Of Interstitial [place];  Surgeon: Elsy Elizabeth MD;  Location: [facility] OR     TUBAL LIGATION           Health Maintenance:  Health Maintenance Due   Topic Date Due     PREVENTIVE CARE VISIT  1960     ALT  1960     HF ACTION PLAN  1960     LIPID  1960     MICROALBUMIN  1960     TSH W/FREE T4 REFLEX  1960     DIABETIC FOOT EXAM  1960     HEPATITIS C SCREENING  1960     ADVANCE CARE PLANNING  1960     DEPRESSION ACTION PLAN  1960     EYE EXAM  1960     MAMMO SCREENING  1960     HIV SCREENING  11/11/1975     HPV  11/11/1981     ZOSTER IMMUNIZATION (1 of 2) 11/11/2010     PNEUMOCOCCAL IMMUNIZATION 19-64 HIGHEST RISK (3 of 3 - PCV13) 06/22/2011     INFLUENZA VACCINE (1) 09/01/2019     A1C  09/07/2019     BMP  09/07/2019     PHQ-9  09/07/2019         Current Medications:     has a current medication list which includes the following prescription(s): acetaminophen, albuterol, albuterol, atorvastatin, cetirizine, docusate sodium, fluoxetine, fluticasone, gabapentin, insulin lispro prot & lispro, metformin hcl, omeprazole, xarelto anticoagulant, accu-chek denys plus, and blood glucose.       Allergies:     [unfilled]        Social History:     Social History     Tobacco Use     Smoking status: Never Smoker     Smokeless tobacco: Never Used   Substance Use Topics     Alcohol use: No     Alcohol/week: 0.0 standard drinks     Frequency: Never       History   Drug Use No           Family History:     The patient's family history is notable for :    Family History   Problem Relation Age of Onset     Cancer Mother         ovarian     Cancer Maternal Grandmother         Breast      Deep Vein Thrombosis Granddaughter         dvt following birth of baby      Cardiac Sudden Death No family hx of      Myocardial Infarction No family hx of      Anesthesia Reaction No family hx of          Physical Exam:     /54   Pulse 77   Temp 97.8  F (36.6  C) (Oral)   Resp 16   Wt 122.9 kg (271 lb)   SpO2 95%   BMI 49.55 kg/m    Body mass index is 49.55 kg/m .    General Appearance: healthy and alert, no distress     Musculoskeletal: extremities non tender and without edema    Skin: no lesions or rashes     Neurological: normal gait, no gross defects     Psychiatric: appropriate mood and affect                               Hematological: normal cervical, supraclavicular and inguinal lymph nodes     Gastrointestinal:       abdomen soft, non-tender, non-distended, no organomegaly or masses    Genitourinary: External genitalia and urethral meatus appears normal.  Vagina with post radiation changes.  Fixation at apex and anteriorly, mild scarring, no obvious signs of recurrence.          Assessment:    Lilli is a 58 y.o. old woman with a vaginal cuff/pelvic recurrence of her endometrial cancer    A total of 30 minutes was spent with the patient, 25 minutes of which were spent in counseling the patient and/or treatment planning.    Plan:     1.) Vaginal cuff/pelvic recurrence of her endometrial cancer: Has completed radiation therapy, tolerated well.     No obvious signs of recurrence on exam. Monitor closely.  Repeat in three months and then prn         2.) Vaginal dilator: Continue use     3.)  Pulmonary nodule:  Continue close follow-up.  Repeat PET three months         Crissy Fraga MD  Gynecologic Oncology  [facility]    CC  Patient Care Team:  José Le MD as PCP - General (Internal Medicine)  SELF, REFERRED

## 2020-01-13 ENCOUNTER — ANCILLARY PROCEDURE (OUTPATIENT)
Dept: PET IMAGING | Facility: CLINIC | Age: 60
End: 2020-01-13
Attending: OBSTETRICS & GYNECOLOGY
Payer: COMMERCIAL

## 2020-01-13 DIAGNOSIS — C54.1 ENDOMETRIAL CANCER (H): ICD-10-CM

## 2020-01-13 LAB
CREAT BLD-MCNC: 0.9 MG/DL (ref 0.5–1.2)
GFR SERPL CREATININE-BSD FRML MDRD: NORMAL ML/MIN/{1.73_M2}
GFRB: 81
GLUCOSE SERPL-MCNC: 151 MG/DL (ref 70–99)

## 2020-01-13 RX ORDER — FUROSEMIDE 10 MG/ML
40 INJECTION INTRAMUSCULAR; INTRAVENOUS ONCE
Status: COMPLETED | OUTPATIENT
Start: 2020-01-13 | End: 2020-01-13

## 2020-01-13 RX ADMIN — FUROSEMIDE 40 MG: 10 INJECTION INTRAMUSCULAR; INTRAVENOUS at 08:21

## 2020-01-13 NOTE — DISCHARGE INSTRUCTIONS

## 2020-01-14 ENCOUNTER — ONCOLOGY VISIT (OUTPATIENT)
Dept: ONCOLOGY | Facility: CLINIC | Age: 60
End: 2020-01-14
Attending: OBSTETRICS & GYNECOLOGY
Payer: COMMERCIAL

## 2020-01-14 VITALS
DIASTOLIC BLOOD PRESSURE: 73 MMHG | RESPIRATION RATE: 18 BRPM | BODY MASS INDEX: 49.19 KG/M2 | WEIGHT: 269 LBS | HEART RATE: 62 BPM | OXYGEN SATURATION: 97 % | TEMPERATURE: 98.3 F | SYSTOLIC BLOOD PRESSURE: 110 MMHG

## 2020-01-14 DIAGNOSIS — C54.1 ENDOMETRIAL CANCER (H): Primary | ICD-10-CM

## 2020-01-14 PROCEDURE — 99214 OFFICE O/P EST MOD 30 MIN: CPT | Mod: ZP | Performed by: OBSTETRICS & GYNECOLOGY

## 2020-01-14 PROCEDURE — G0463 HOSPITAL OUTPT CLINIC VISIT: HCPCS | Mod: ZF

## 2020-01-14 ASSESSMENT — PAIN SCALES - GENERAL: PAINLEVEL: NO PAIN (0)

## 2020-01-14 NOTE — LETTER
1/14/2020       RE: Lilli Locke  [address]  [place] [address]     Dear Colleague,    Thank you for referring your patient, Lilli Locke, to the [facility]. Please see a copy of my visit note below.                            Consult Notes on Referred Patient    Date: 1/14/2020        Patient returns today for follow-up in reference to her recurrent endometrial cancer        Her history is as follows:     She is status post diagnostic laparoscopy converted to exploratory laparotomy, LELE, BSO, BPLND, omentectomy, cysto on 8-. Final pathology showed a FIGO Grade 1 endometrioid SUE, 6.5cm greatest dimension, <50% myometrial invasion (0.5/3.2cm), no LVI, negative cytology, negative margins, negative nodes. Omentum, adnexa negative.      Patient's postoperative course was complicated by readmission with PE (prior history of PE, discharged on home rivaroxaban) and nausea and vomiting.      The patient had limited follow-up. Was seen in Gyn Onc clinic at [facility] in 9/2014 and most recently, in 4/2016, with recs for six month follow-up.    The patient sought care in 11/2017 for vaginal bleeding and was seen by a local Ob/Gyn. Exam was negative. However, since that time, she reportedly has been bleeding regularly. She had a CT 11/18 which was negative, US 11/18 also negative. She had a pap smear by her PCP showing AGC worrisome for malignancy. She was then [place] [facility] [place]. Exam notable for lesion at vaginal cuff which was biopsied. Path showed invasive SUE consistent with endometrial origin. PET on 12/29/18 showed soft tissue thickening at vaginal cuff concerning for recurrence with loss of fat plane at anterior wall rectum and posterior wall bladder. No metastatic disease.      1/18/2019:  Seen by me at [facility].  Exam notable for large mass at top of cuff.  Discussed referral to Rad Onc at [facility] for radiation therapy and MRI.     1/25/19:   MRI pelvis:  2.1 x 3.6 x 1.8 cm enhancing soft tissue mass in the right vaginal cuff with posterior extension into the anterior wall of the rectum. No evidence of bladder invasion, pelvic lymphadenopathy or metastatic disease.     19:  Seen by Rad Onc with plans for EBRT, vaginal cuff brachytherapy via interstitial implant     19 to 3/20/19:  EBRT pelvis, vaginal cuff brachytherapy via interstitial implant     19:  PET scan: No hypermetabolic activity or irregular vaginal wall thickening to suggest residual disease.  Indeterminate 3mm solid pulmonary nodule, attention on follow-up.     20:  PET scan:  KEEGAN    The patient returns today for routine surveillance visit.  She is doing well, no new symptoms.   Not using dilator at this time.  No vaginal bleeding or spotting, no changes in bowel or bladder function.  No new pain symptoms.       Past Medical History:    Past Medical History:   Diagnosis Date     Antiplatelet or antithrombotic long-term use     on Xarelto since  for unprovoked PE     Complication of anesthesia     PONV following hysterectomy     Diabetes mellitus type 2, insulin dependent (H)     on insulin & metformin     Diabetic neuropathy, type II diabetes mellitus (H)     hands & feet     Diastolic CHF, acute on chronic (H) 10/20/2012     Dyspnea on exertion      Endometrial cancer (H)      GERD (gastroesophageal reflux disease)     on omeprazole     Hx of pulmonary embolus      LVH (left ventricular hypertrophy) due to hypertensive disease 10/20/2012     Mixed hyperlipidemia      Obesity     BMI 55     Obstructive sleep apnea     has CPAP     Sacroiliitis (H) 2/15/2017     Substance abuse (H)      Thyroid nodule 2015     Uncomplicated asthma     Uses albuterol inhaler 1-2x/yr mostly during summer months         Past Surgical History:    Past Surgical History:   Procedure Laterality Date      SECTION      x2     DENTAL SURGERY      wisdom teeth extraction      HYSTERECTOMY TOTAL ABDOMINAL, BILATERAL SALPINGO-OOPHORECTOMY, COMBINED  2013    cancer     INSERT INTERSTITIAL NEEDLE, ULTRASOUND GUIDED N/A 3/18/2019    Procedure: Exam Under Anesthesia, Insert Interstitial Needles With Ultrasound Guidance;  Surgeon: Elsy Elizabeth MD;  Location: [facility] OR     LAPAROSCOPY DIAGNOSTIC (GYN) N/A 3/18/2019    Procedure: Diagnostic Laparoscopy **Latex Allergy**;  Surgeon: Yohana Lau MD;  Location: [facility] OR     REMOVE INTERSTITIAL NEEDLE N/A 3/20/2019    Procedure: Removal Of Interstitial [place];  Surgeon: Elsy Elizabeth MD;  Location: [facility] OR     TUBAL LIGATION           Health Maintenance:  Health Maintenance Due   Topic Date Due     PREVENTIVE CARE VISIT  1960     ALT  1960     HF ACTION PLAN  1960     LIPID  1960     MICROALBUMIN  1960     TSH W/FREE T4 REFLEX  1960     DIABETIC FOOT EXAM  1960     HEPATITIS C SCREENING  1960     ADVANCE CARE PLANNING  1960     DEPRESSION ACTION PLAN  1960     EYE EXAM  1960     MAMMO SCREENING  1960     HIV SCREENING  11/11/1975     HPV  11/11/1981     ZOSTER IMMUNIZATION (1 of 2) 11/11/2010     INFLUENZA VACCINE (1) 09/01/2019     A1C  09/07/2019     BMP  09/07/2019     PHQ-9  09/07/2019         Current Medications:     has a current medication list which includes the following prescription(s): accu-chek denys plus, acetaminophen, albuterol, albuterol, atorvastatin, blood glucose, cetirizine, docusate sodium, fluoxetine, fluticasone, gabapentin, insulin lispro prot & lispro, metformin hcl, omeprazole, and xarelto anticoagulant.       Allergies:     [unfilled]        Social History:     Social History     Tobacco Use     Smoking status: Never Smoker     Smokeless tobacco: Never Used   Substance Use Topics     Alcohol use: No     Alcohol/week: 0.0 standard drinks     Frequency: Never       History   Drug Use No           Family History:     The patient's family history  is notable for:    Family History   Problem Relation Age of Onset     Cancer Mother         ovarian     Cancer Maternal Grandmother         Breast      Deep Vein Thrombosis Granddaughter         dvt following birth of baby     Cardiac Sudden Death No family hx of      Myocardial Infarction No family hx of      Anesthesia Reaction No family hx of          Physical Exam:     /73   Pulse 62   Temp 98.3  F (36.8  C) (Oral)   Resp 18   Wt 122 kg (269 lb)   SpO2 97%   BMI 49.19 kg/m     Body mass index is 49.19 kg/m .    General Appearance: healthy and alert, no distress     Musculoskeletal: extremities non tender and without edema    Skin: no lesions or rashes     Neurological: normal gait, no gross defects     Psychiatric: appropriate mood and affect                               Hematological: normal cervical, supraclavicular and inguinal lymph nodes     Gastrointestinal:       abdomen soft, non-tender, non-distended, no organomegaly or masses    Genitourinary:  Declined    Assessment:    Lilli is a 59 y.o. old woman with a vaginal cuff/pelvic recurrence of her endometrial cancer    A total of 30 minutes was spent with the patient, 25 minutes of which were spent in counseling the patient and/or treatment planning.    Plan:     1.) Vaginal cuff/pelvic recurrence of her endometrial cancer: Has completed radiation therapy, tolerated well.     No obvious signs of recurrence on exam. Monitor closely.  Repeat PET scan today negative.  No further imaging unless indicated based on symptoms or exam findings.     Return in three months with NP      2.) Vaginal dilator: Discussed need to resume vaginal dilator and anticipate a pelvic exam at next visit.  Patient expressed understanding.     3.)  Pulmonary nodule:  Stable on repeat imaging.        Crissy Fraga MD  Gynecologic Oncology  [facility]    CC  Patient Care Team:  José Le MD as PCP - General (Internal Medicine)  SELF,  REFERRED      Again, thank you for allowing me to participate in the care of your patient.      Sincerely,    Crissy Fraga MD

## 2020-01-14 NOTE — PATIENT INSTRUCTIONS
PET scan stable    Continue vaginal dilator    Return visit in three months with NP    Crissy Fraga MD  Gynecologic Oncology  [facility]

## 2020-01-14 NOTE — PROGRESS NOTES
Consult Notes on Referred Patient    Date: 1/14/2020        Patient returns today for follow-up in reference to her recurrent endometrial cancer        Her history is as follows:     She is status post diagnostic laparoscopy converted to exploratory laparotomy, LELE, BSO, BPLND, omentectomy, cysto on 8-. Final pathology showed a FIGO Grade 1 endometrioid SUE, 6.5cm greatest dimension, <50% myometrial invasion (0.5/3.2cm), no LVI, negative cytology, negative margins, negative nodes. Omentum, adnexa negative.      Patient's postoperative course was complicated by readmission with PE (prior history of PE, discharged on home rivaroxaban) and nausea and vomiting.      The patient had limited follow-up. Was seen in Gyn Onc clinic at [facility] in 9/2014 and most recently, in 4/2016, with recs for six month follow-up.    The patient sought care in 11/2017 for vaginal bleeding and was seen by a local Ob/Gyn. Exam was negative. However, since that time, she reportedly has been bleeding regularly. She had a CT 11/18 which was negative, US 11/18 also negative. She had a pap smear by her PCP showing AGC worrisome for malignancy. She was then [place] [facility] [place]. Exam notable for lesion at vaginal cuff which was biopsied. Path showed invasive SUE consistent with endometrial origin. PET on 12/29/18 showed soft tissue thickening at vaginal cuff concerning for recurrence with loss of fat plane at anterior wall rectum and posterior wall bladder. No metastatic disease.      1/18/2019:  Seen by me at [facility].  Exam notable for large mass at top of cuff.  Discussed referral to Rad Onc at [facility] for radiation therapy and MRI.     1/25/19:  MRI pelvis:  2.1 x 3.6 x 1.8 cm enhancing soft tissue mass in the right vaginal cuff with posterior extension into the anterior wall of the rectum. No evidence of bladder invasion, pelvic lymphadenopathy or metastatic disease.     1/30/19:  Seen by Rad  Onc with plans for EBRT, vaginal cuff brachytherapy via interstitial implant     19 to 3/20/19:  EBRT pelvis, vaginal cuff brachytherapy via interstitial implant     19:  PET scan: No hypermetabolic activity or irregular vaginal wall thickening to suggest residual disease.  Indeterminate 3mm solid pulmonary nodule, attention on follow-up.     20:  PET scan:  KEEGAN    The patient returns today for routine surveillance visit.  She is doing well, no new symptoms.  Not using dilator at this time.  No vaginal bleeding or spotting, no changes in bowel or bladder function.  No new pain symptoms.       Past Medical History:    Past Medical History:   Diagnosis Date     Antiplatelet or antithrombotic long-term use     on Xarelto since  for unprovoked PE     Complication of anesthesia 2013    PONV following hysterectomy     Diabetes mellitus type 2, insulin dependent (H)     on insulin & metformin     Diabetic neuropathy, type II diabetes mellitus (H)     hands & feet     Diastolic CHF, acute on chronic (H) 10/20/2012     Dyspnea on exertion      Endometrial cancer (H)      GERD (gastroesophageal reflux disease)     on omeprazole     Hx of pulmonary embolus      LVH (left ventricular hypertrophy) due to hypertensive disease 10/20/2012     Mixed hyperlipidemia      Obesity     BMI 55     Obstructive sleep apnea     has CPAP     Sacroiliitis (H) 2/15/2017     Substance abuse (H)      Thyroid nodule 2015     Uncomplicated asthma     Uses albuterol inhaler 1-2x/yr mostly during summer months         Past Surgical History:    Past Surgical History:   Procedure Laterality Date      SECTION      x2     DENTAL SURGERY      wisdom teeth extraction     HYSTERECTOMY TOTAL ABDOMINAL, BILATERAL SALPINGO-OOPHORECTOMY, COMBINED      cancer     INSERT INTERSTITIAL NEEDLE, ULTRASOUND GUIDED N/A 3/18/2019    Procedure: Exam Under Anesthesia, Insert Interstitial Needles With Ultrasound Guidance;  Surgeon:  Elsy Elizabeth MD;  Location: [facility] OR     LAPAROSCOPY DIAGNOSTIC (GYN) N/A 3/18/2019    Procedure: Diagnostic Laparoscopy **Latex Allergy**;  Surgeon: Yohana Lau MD;  Location: [facility] OR     REMOVE INTERSTITIAL NEEDLE N/A 3/20/2019    Procedure: Removal Of Interstitial [place];  Surgeon: Elsy Elizabeth MD;  Location: [facility] OR     TUBAL LIGATION           Health Maintenance:  Health Maintenance Due   Topic Date Due     PREVENTIVE CARE VISIT  1960     ALT  1960     HF ACTION PLAN  1960     LIPID  1960     MICROALBUMIN  1960     TSH W/FREE T4 REFLEX  1960     DIABETIC FOOT EXAM  1960     HEPATITIS C SCREENING  1960     ADVANCE CARE PLANNING  1960     DEPRESSION ACTION PLAN  1960     EYE EXAM  1960     MAMMO SCREENING  1960     HIV SCREENING  11/11/1975     HPV  11/11/1981     ZOSTER IMMUNIZATION (1 of 2) 11/11/2010     INFLUENZA VACCINE (1) 09/01/2019     A1C  09/07/2019     BMP  09/07/2019     PHQ-9  09/07/2019         Current Medications:     has a current medication list which includes the following prescription(s): accu-chek denys plus, acetaminophen, albuterol, albuterol, atorvastatin, blood glucose, cetirizine, docusate sodium, fluoxetine, fluticasone, gabapentin, insulin lispro prot & lispro, metformin hcl, omeprazole, and xarelto anticoagulant.       Allergies:     [unfilled]        Social History:     Social History     Tobacco Use     Smoking status: Never Smoker     Smokeless tobacco: Never Used   Substance Use Topics     Alcohol use: No     Alcohol/week: 0.0 standard drinks     Frequency: Never       History   Drug Use No           Family History:     The patient's family history is notable for:    Family History   Problem Relation Age of Onset     Cancer Mother         ovarian     Cancer Maternal Grandmother         Breast      Deep Vein Thrombosis Granddaughter         dvt following birth of baby     Cardiac Sudden Death No  family hx of      Myocardial Infarction No family hx of      Anesthesia Reaction No family hx of          Physical Exam:     /73   Pulse 62   Temp 98.3  F (36.8  C) (Oral)   Resp 18   Wt 122 kg (269 lb)   SpO2 97%   BMI 49.19 kg/m    Body mass index is 49.19 kg/m .    General Appearance: healthy and alert, no distress     Musculoskeletal: extremities non tender and without edema    Skin: no lesions or rashes     Neurological: normal gait, no gross defects     Psychiatric: appropriate mood and affect                               Hematological: normal cervical, supraclavicular and inguinal lymph nodes     Gastrointestinal:       abdomen soft, non-tender, non-distended, no organomegaly or masses    Genitourinary:  Declined    Assessment:    Lilli is a 59 y.o. old woman with a vaginal cuff/pelvic recurrence of her endometrial cancer    A total of 30 minutes was spent with the patient, 25 minutes of which were spent in counseling the patient and/or treatment planning.    Plan:     1.) Vaginal cuff/pelvic recurrence of her endometrial cancer: Has completed radiation therapy, tolerated well.     No obvious signs of recurrence on exam. Monitor closely.  Repeat PET scan today negative.  No further imaging unless indicated based on symptoms or exam findings.     Return in three months with NP      2.) Vaginal dilator: Discussed need to resume vaginal dilator and anticipate a pelvic exam at next visit.  Patient expressed understanding.     3.)  Pulmonary nodule:  Stable on repeat imaging.        Crissy Fraga MD  Gynecologic Oncology  [facility]    CC  Patient Care Team:  José Le MD as PCP - General (Internal Medicine)  SELF, REFERRED

## 2020-05-12 ENCOUNTER — VIRTUAL VISIT (OUTPATIENT)
Dept: ONCOLOGY | Facility: CLINIC | Age: 60
End: 2020-05-12
Attending: OBSTETRICS & GYNECOLOGY
Payer: COMMERCIAL

## 2020-05-12 DIAGNOSIS — C54.1 ENDOMETRIAL CANCER (H): Primary | ICD-10-CM

## 2020-05-12 DIAGNOSIS — R91.8 PULMONARY NODULES: ICD-10-CM

## 2020-05-12 PROCEDURE — 40000114 ZZH STATISTIC NO CHARGE CLINIC VISIT

## 2020-05-12 PROCEDURE — 99214 OFFICE O/P EST MOD 30 MIN: CPT | Mod: GT | Performed by: OBSTETRICS & GYNECOLOGY

## 2020-05-12 RX ORDER — GABAPENTIN 300 MG/1
CAPSULE ORAL
COMMUNITY
Start: 2020-05-08

## 2020-05-12 NOTE — PROGRESS NOTES
"Lilli Locke is a 59 year old female who is being evaluated via a billable video visit.      The patient has been notified of following:     \"This video visit will be conducted via a call between you and your physician/provider. We have found that certain health care needs can be provided without the need for an in-person physical exam.  This service lets us provide the care you need with a video conversation.  If a prescription is necessary we can send it directly to your pharmacy.  If lab work is needed we can place an order for that and you can then stop by our lab to have the test done at a later time.    Video visits are billed at different rates depending on your insurance coverage.  Please reach out to your insurance provider with any questions.    If during the course of the call the physician/provider feels a video visit is not appropriate, you will not be charged for this service.\"    Patient has given verbal consent for Video visit? Yes    How would you like to obtain your AVS? Mail a copy    Patient would like the video invitation sent by: Text to cell phone: [phone]    Will anyone else be joining your video visit? No       I have reviewed and updated the patient's allergies and medication list.    Concerns: No new concerns.   Refills: None needed.     Secondary Video Option (Doximity), send text message to: [phone]    Mackenzie Ledezma CMA          Video-Visit Details                            Video Consult on Referred Patient    Date: 5/12/2020     Patient returns today for follow-up in reference to her recurrent endometrial cancer        Her history is as follows:     She is status post diagnostic laparoscopy converted to exploratory laparotomy, LELE, BSO, BPLND, omentectomy, cysto on 8-. Final pathology showed a FIGO Grade 1 endometrioid SUE, 6.5cm greatest dimension, <50% myometrial invasion (0.5/3.2cm), no LVI, negative cytology, negative margins, negative nodes. Omentum, adnexa " negative.      Patient's postoperative course was complicated by readmission with PE (prior history of PE, discharged on home rivaroxaban) and nausea and vomiting.      The patient had limited follow-up. Was seen in Gyn Onc clinic at [facility] in 9/2014 and most recently, in 4/2016, with recs for six month follow-up.    The patient sought care in 11/2017 for vaginal bleeding and was seen by a local Ob/Gyn. Exam was negative. However, since that time, she reportedly has been bleeding regularly. She had a CT 11/18 which was negative, US 11/18 also negative. She had a pap smear by her PCP showing AGC worrisome for malignancy. She was then seen by another Ob/Gyn at [facility] on 12/17/18. Exam notable for lesion at vaginal cuff which was biopsied. Path showed invasive SUE consistent with endometrial origin. PET on 12/29/18 showed soft tissue thickening at vaginal cuff concerning for recurrence with loss of fat plane at anterior wall rectum and posterior wall bladder. No metastatic disease.      1/18/2019:  Seen by me at [facility].  Exam notable for large mass at top of cuff.  Discussed referral to Rad Onc at [facility] for radiation therapy and MRI.     1/25/19:  MRI pelvis:  2.1 x 3.6 x 1.8 cm enhancing soft tissue mass in the right vaginal cuff with posterior extension into the anterior wall of the rectum. No evidence of bladder invasion, pelvic lymphadenopathy or metastatic disease.     1/30/19:  Seen by Rad Onc with plans for EBRT, vaginal cuff brachytherapy via interstitial implant     2/11/19 to 3/20/19:  EBRT pelvis, vaginal cuff brachytherapy via interstitial implant     6/21/19:  PET scan: No hypermetabolic activity or irregular vaginal wall thickening to suggest residual disease.  Indeterminate 3mm solid pulmonary nodule, attention on follow-up.     1/13/20:  PET scan:  KEEGAN     The patient was called today on video due to COVID pandemic. No fevers, no chills, no nausea or vomiting.  Appetite and energy good, weight stable.  No  chest pain or shortness or breath.  No new pain symptoms.  No abdominal pain, no difficulty with bowel or bladder function, no constipation or diarrhea, no urinary leakage/urgency/pain.  No vaginal bleeding, is not sexually active. Not using her dilator. No lower extremity pain or swelling.  Wondering about pulmonary nodules seen on PET in January.      Past Medical History:    Past Medical History:   Diagnosis Date     Antiplatelet or antithrombotic long-term use     on Xarelto since  for unprovoked PE     Complication of anesthesia     PONV following hysterectomy     Diabetes mellitus type 2, insulin dependent (H)     on insulin & metformin     Diabetic neuropathy, type II diabetes mellitus (H)     hands & feet     Diastolic CHF, acute on chronic (H) 10/20/2012     Dyspnea on exertion      Endometrial cancer (H)      GERD (gastroesophageal reflux disease)     on omeprazole     Hx of pulmonary embolus      LVH (left ventricular hypertrophy) due to hypertensive disease 10/20/2012     Mixed hyperlipidemia      Obesity     BMI 55     Obstructive sleep apnea     has CPAP     Sacroiliitis (H) 2/15/2017     Substance abuse (H)      Thyroid nodule 2015     Uncomplicated asthma     Uses albuterol inhaler 1-2x/yr mostly during summer months         Past Surgical History:    Past Surgical History:   Procedure Laterality Date      SECTION      x2     DENTAL SURGERY      wisdom teeth extraction     HYSTERECTOMY TOTAL ABDOMINAL, BILATERAL SALPINGO-OOPHORECTOMY, COMBINED      cancer     INSERT INTERSTITIAL NEEDLE, ULTRASOUND GUIDED N/A 3/18/2019    Procedure: Exam Under Anesthesia, Insert Interstitial Needles With Ultrasound Guidance;  Surgeon: Elsy Elizabeth MD;  Location: [facility] OR     LAPAROSCOPY DIAGNOSTIC (GYN) N/A 3/18/2019    Procedure: Diagnostic Laparoscopy **Latex Allergy**;  Surgeon: Yohana Lau MD;  Location: [facility] OR     REMOVE INTERSTITIAL NEEDLE N/A 3/20/2019    Procedure:  Removal Of Interstitial [place];  Surgeon: Elsy Elizabeth MD;  Location: [facility] OR     TUBAL LIGATION           Health Maintenance:  Health Maintenance Due   Topic Date Due     PREVENTIVE CARE VISIT  1960     ALT  1960     HF ACTION PLAN  1960     LIPID  1960     MICROALBUMIN  1960     TSH W/FREE T4 REFLEX  1960     DIABETIC FOOT EXAM  1960     HEPATITIS C SCREENING  1960     ADVANCE CARE PLANNING  1960     DEPRESSION ACTION PLAN  1960     EYE EXAM  1960     MAMMO SCREENING  1960     HIV SCREENING  11/11/1975     ZOSTER IMMUNIZATION (1 of 2) 11/11/2010     A1C  09/07/2019     BMP  09/07/2019     PHQ-9  09/07/2019     DTAP/TDAP/TD IMMUNIZATION (3 - Td) 01/29/2020     CBC  03/18/2020       Current Medications:     has a current medication list which includes the following prescription(s): accu-chek denys plus, acetaminophen, albuterol, albuterol, atorvastatin, blood glucose, cetirizine, docusate sodium, fluoxetine, fluticasone, gabapentin, insulin lispro protamine-insulin lispro, metformin hcl, omeprazole, xarelto anticoagulant, gabapentin, and metformin.       Allergies:     [unfilled]        Social History:     Social History     Tobacco Use     Smoking status: Never Smoker     Smokeless tobacco: Never Used   Substance Use Topics     Alcohol use: No     Alcohol/week: 0.0 standard drinks     Frequency: Never       History   Drug Use No           Family History:     The patient's family history is notable for:    Family History   Problem Relation Age of Onset     Cancer Mother         ovarian     Cancer Maternal Grandmother         Breast      Deep Vein Thrombosis Granddaughter         dvt following birth of baby     Cardiac Sudden Death No family hx of      Myocardial Infarction No family hx of      Anesthesia Reaction No family hx of          Physical Exam:     There were no vitals taken for this visit.  There is no height or weight on file to  calculate BMI.    GENERAL: Healthy, alert and no distress  EYES: Eyes grossly normal to inspection.  No discharge or erythema, or obvious scleral/conjunctival abnormalities.  RESP: No audible wheeze, cough, or visible cyanosis.  No visible retractions or increased work of breathing.    SKIN: Visible skin clear. No significant rash, abnormal pigmentation or lesions.  NEURO: Cranial nerves grossly intact.  Mentation and speech appropriate for age.  PSYCH: Mentation appears normal, affect normal/bright, judgement and insight intact, normal speech and appearance well-groomed.    Assessment:    Lilli is a 59 y.o. old woman with a vaginal cuff/pelvic recurrence of her endometrial cancer    A total of 25 minutes was spent with the patient, 20 minutes of which were spent in counseling the patient and/or treatment planning.    Plan:     1.) Vaginal cuff/pelvic recurrence of her endometrial cancer: Has completed radiation therapy, tolerated well.     No obvious signs of recurrence on exam in January and currenly KEEGAN based on no new symptoms. Continue to monitor closely.  Repeat PET scan in January negative.  No further imaging unless indicated based on symptoms or exam findings.      Return in three months with NP      2.) Vaginal dilator: Discussed need to resume vaginal dilator and anticipate a pelvic exam at next visit.  Patient expressed understanding.     3.)  Pulmonary nodule:  Stable on repeat imaging. Will get CT Chest in three months to follow-up on stablity.       Crissy Fraga MD  Gynecologic Oncology  [facility]    CC  Patient Care Team:  José Le MD as PCP - General (Internal Medicine)  SELF, REFERRED      Type of service:  Video Visit      Originating Location (pt. Location): Home    Distant Location (provider location):  [facility]     Platform used for Video Visit: HistoRx.  Attempted AmWell but patient not picking up    Crissy Fraga  MD  Gynecologic Oncology  [facility],

## 2020-05-12 NOTE — PATIENT INSTRUCTIONS
Continue vaginal dilator    Return visit in three months with CT chest and exam    Crissy Fraga MD  Gynecologic Oncology  [facility]

## 2020-06-09 ENCOUNTER — TELEPHONE (OUTPATIENT)
Dept: ONCOLOGY | Facility: CLINIC | Age: 60
End: 2020-06-09

## 2020-12-14 NOTE — ANESTHESIA PREPROCEDURE EVALUATION
Anesthesia Pre-Procedure Evaluation    Patient: Lilli Locke   MRN:     [id number] Gender:   female   Age:    58 year old :      1960        Preoperative Diagnosis: * No surgery found *        Past Medical History:   Diagnosis Date     Endometrial cancer (H)      GERD (gastroesophageal reflux disease)      Hx of pulmonary embolus      Mixed hyperlipidemia      Obesity     BMI 55     Obstructive sleep apnea     has CPAP     Substance abuse (H)      Type II diabetes mellitus (H)       Past Surgical History:   Procedure Laterality Date      SECTION      x2     DENTAL SURGERY      wisdom teeth extraction     HYSTERECTOMY      cancer, ovaries removed also ([facility])     TUBAL LIGATION            Anesthesia Evaluation     . Pt has had prior anesthetic. Type: General and MAC    History of anesthetic complications   - PONV  PONV following hysterectomy; tolerated MAC w/ colonscopy       ROS/MED HX    ENT/Pulmonary: Comment: Uses albuterol inhaler approx. 1-2x/year, more during summer months    (+)sleep apnea, Intermittent asthma Last exacerbation: 3/4/19,Treatment: Inhaler prn,  uses CPAP , . .    Neurologic: Comment: Neuropathy in hands & feet - on gabapentin    (+)neuropathy - diabetic neuropathy, hands & feet, on gabapentin,     Cardiovascular:     (+) Dyslipidemia, ----. Taking blood thinners Pt has not received instructions: Instructions Given to patient: stop Xarelto 72 hrs prior to surgery. . LANDRY, . :. . Previous cardiac testing Echodate:13results:Stress Testdate:00 results:ECG reviewed date:2/5/15 results: date: results:          METS/Exercise Tolerance: Comment: Unable to walk 1 block w/o stopping, avoids stairs, able to dust & do light housework, denies CP/angina 1 - Eating, dressing   Hematologic: Comments: Had unprovoked PE, started coumadin , switched to xarelto after hysterectomy    (+) History of blood clots pt is anticoagulated, -      Musculoskeletal:  -  Spoke with Dr Yaz Ziegler RE: Patient having active chest pain on Tridil increased med per protocol/order with no relief at this point increased Troponin from 0.09 to 0.34 orders give and initiated "neg musculoskeletal ROS       GI/Hepatic: Comment: On omeprazole    (+) GERD Asymptomatic on medication,       Renal/Genitourinary:  - ROS Renal section negative   (+) Pt has no history of transplant,       Endo: Comment: Takes daily insulin & metformin    (+) type II DM Last HgA1c: 9.5 date: 11/5/18 Using insulin - not using insulin pump Normal glucose range: 108-141 per pt report not previously admitted for DM/DKA Diabetic complications: neuropathy, Obesity, .      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 Rate: Normal     Resp. Effort: Normal      CV: Rhythm: Regular  Rate: Age appropriate  Heart: Normal Sounds   Comments:      Dental: Normal                  No results found for: WBC, HGB, HCT, PLT, CRP, SED, NA, POTASSIUM, CHLORIDE, CO2, BUN, CR, GLC, NEFTALI, PHOS, MAG, ALBUMIN, PROTTOTAL, ALT, AST, GGT, ALKPHOS, BILITOTAL, BILIDIRECT, LIPASE, AMYLASE, YVONNE, PTT, INR, FIBR, TSH, T4, T3, HCG, HCGS, CKTOTAL, CKMB, TROPN    Preop Vitals  BP Readings from Last 3 Encounters:   03/07/19 142/61   03/04/19 120/75   02/25/19 124/53    Pulse Readings from Last 3 Encounters:   03/07/19 67   03/04/19 57   02/25/19 64      Resp Readings from Last 3 Encounters:   03/07/19 18    SpO2 Readings from Last 3 Encounters:   03/07/19 97%   02/18/19 94%   02/12/19 96%      Temp Readings from Last 1 Encounters:   03/07/19 98.1  F (36.7  C) (Oral)    Ht Readings from Last 1 Encounters:   03/07/19 1.626 m (5' 4\")      Wt " "Readings from Last 1 Encounters:   03/07/19 132.1 kg (291 lb 4.8 oz)    Estimated body mass index is 50 kg/m  as calculated from the following:    Height as of this encounter: 1.626 m (5' 4\").    Weight as of this encounter: 132.1 kg (291 lb 4.8 oz).     LDA:            BRUNILDA FV AN PLAN NO PONV RULE         PAC Discussion and Assessment    ASA Classification: 3  Case is suitable for: [place]  Anesthetic techniques and relevant risks discussed: GA with regional block for post-op pain control  Invasive monitoring and risk discussed: No  Types:   Possibility and Risk of blood transfusion discussed: No  NPO instructions given:   Additional anesthetic preparation and risks discussed:   Needs early admission to pre-op area:   Other:     PAC Resident/NP Anesthesia Assessment:  Lilli Locke is a 58 year old female scheduled to undergo insertion of interstitial needles with Laparoscopic guidance and Ultrasound guidance with Elsy Elizabeth MD on 3/18/19 at [facility] for treatment of endometrial cancer. The needles are scheduled to be removed on 3/20/19.   She has the following specific operative considerations:   - RCRI : No serious cardiac risks.  0.4% risk of major adverse cardiac event.   - Anesthesia considerations:  Refer to PAC assessment in anesthesia records  - VTE risk: 4.5% (BMI >40, hx VTE, current cancer)  - QUINTIN # of risks - uses CPAP for QUINTIN  - Risk of PONV score = 4.  If > 2, anti-emetic intervention recommended.    Pt has had prior anesthetic. Type: General and MAC    History of anesthetic complications   - PONV  PONV following hysterectomy; tolerated MAC w/ colonscopy 2018    1) Cardiac: METS 1, unable to walk 1 block w/o stopping, avoids stairs, denies CP/angina (see prior studies above)  2) Pulmonary: + QUINTIN, uses CPAP; non-smoker  3) GI: GERD well managed on omeprazole, pt instructed to take med evening prior to surgery  4) : active vaginal cuff cancer, " renal function WNL  5) Endo: BMI 55; Diabetes type 2, on daily insulin & metformin, A1c 9.0 (down from 9.5 on 11/5/18)  6) Heme: hx unprovoked PE, on Xarelto since 2013, will stop 72 hours before surgery  7) Ortho: Full/limited Neck ROM, thick neck    * Increased risk for pressure ulcers, consider mattress choice   * Increased risk for DVT/PE    Patient was discussed with Dr Castillo. Dr Elizabeth staff messaged regarding elevated A1c.        Reviewed and Signed by PAC Mid-Level Provider/Resident  Mid-Level Provider/Resident: Vijaya Baugh PA-C  Date: 3/7/19  Time: 1441    Attending Anesthesiologist Anesthesia Assessment:  I have examined the patient and reviewed the medical record.  I have discussed the patient with the CAITLIN and concur with her assessment  The patient is scheduled for placement of brachytherapy needles for endometrial CA  She has history of QUINTIN  She has history of GERD controlled with omeprazole ( but without it feels bile in throat)  She has poorly controlled IDDM (HgbA1c is 9.5)  She had unprovoked PE over 5 years ago and is on xarelto    PE:  Obesem, pleasant female in [place].  MPC 2-3, thick neck.  Lungs clear  CV  RRR without murmur    Will have her hold xarelto for 72 hours per MATTHEW guidelines  Have her take omeprazole as scheduled without missing dose  TO take albuterol prior to surgery  Contacted primary care about poor DM control but no indication to cancel for this surgery  Final plan per attending anesthesiologist the day of surgery.      Reviewed and Signed by PAC Anesthesiologist  Anesthesiologist: Nathaniel Castillo MD  Date:   Time:   Pass/Fail:   Disposition:     PAC Pharmacist Assessment:        Pharmacist:   Date:   Time:        Vijaya Baugh PA-C

## 2021-08-25 NOTE — TELEPHONE ENCOUNTER
Pt called stating that she fell on her back within the last week, was evaluated at [facility] ED and discharged, and is now experiencing increasing back and neck pain. explained that this was not Dr Fraga's area ofexpertise, and discussed options with the Pt like contacting her PCP or returning to ED. Pt stated she had a provider appointment tomorrow and would follow-up with other providers.  
regular rate and rhythm

## 2021-10-12 ENCOUNTER — ONCOLOGY VISIT (OUTPATIENT)
Dept: ONCOLOGY | Facility: CLINIC | Age: 61
End: 2021-10-12
Attending: OBSTETRICS & GYNECOLOGY
Payer: COMMERCIAL

## 2021-10-12 VITALS
DIASTOLIC BLOOD PRESSURE: 71 MMHG | BODY MASS INDEX: 49.01 KG/M2 | OXYGEN SATURATION: 98 % | WEIGHT: 268 LBS | HEART RATE: 65 BPM | RESPIRATION RATE: 16 BRPM | TEMPERATURE: 98 F | SYSTOLIC BLOOD PRESSURE: 121 MMHG

## 2021-10-12 DIAGNOSIS — C54.1 ENDOMETRIAL CANCER (H): Primary | ICD-10-CM

## 2021-10-12 PROCEDURE — 99214 OFFICE O/P EST MOD 30 MIN: CPT | Performed by: OBSTETRICS & GYNECOLOGY

## 2021-10-12 PROCEDURE — G0463 HOSPITAL OUTPT CLINIC VISIT: HCPCS

## 2021-10-12 ASSESSMENT — PAIN SCALES - GENERAL: PAINLEVEL: NO PAIN (0)

## 2021-10-12 NOTE — NURSING NOTE
"Oncology Rooming Note    October 12, 2021 1:35 PM   Lilli Locke is a 60 year old female who presents for:    Chief Complaint   Patient presents with     Oncology Clinic Visit     P RETURN - ENDOMETRIAL CANCER     Initial Vitals: /71   Pulse 65   Temp 98  F (36.7  C)   Resp 16   Wt 121.6 kg (268 lb)   SpO2 98%   BMI 49.01 kg/m   Estimated body mass index is 49.01 kg/m  as calculated from the following:    Height as of 6/25/19: 1.575 m (5' 2.01\").    Weight as of this encounter: 121.6 kg (268 lb). Body surface area is 2.31 meters squared.  No Pain (0) Comment: Data Unavailable   No LMP recorded. Patient has had a hysterectomy.  Allergies reviewed: Yes  Medications reviewed: Yes    Medications: Medication refills not needed today.  Pharmacy name entered into [employer]: [id number] - [place], [place] - [address]    Clinical concerns: No new concerns. [place] was notified.      Javad Garrett LPN            "

## 2021-10-12 NOTE — PROGRESS NOTES
Consult Notes on Referred Patient    Date: 10/12/2021         Patient returns today for follow-up in reference to her recurrent endometrial cancer        Her history is as follows:     She is status post diagnostic laparoscopy converted to exploratory laparotomy, LELE, BSO, BPLND, omentectomy, cysto on 8-. Final pathology showed a FIGO Grade 1 endometrioid SUE, 6.5cm greatest dimension, <50% myometrial invasion (0.5/3.2cm), no LVI, negative cytology, negative margins, negative nodes. Omentum, adnexa negative.      Patient's postoperative course was complicated by readmission with PE (prior history of PE, discharged on home rivaroxaban) and nausea and vomiting.      The patient had limited follow-up. Was seen in Gyn Onc clinic at [facility] in 9/2014 and most recently, in 4/2016, with recs for six month follow-up.    The patient sought care in 11/2017 for vaginal bleeding and was seen by a local Ob/Gyn. Exam was negative. However, since that time, she reportedly has been bleeding regularly. She had a CT 11/18 which was negative, US 11/18 also negative. She had a pap smear by her PCP showing AGC worrisome for malignancy. She was then seen by another Ob/Gyn at [facility] on 12/17/18. Exam notable for lesion at vaginal cuff which was biopsied. Path showed invasive SUE consistent with endometrial origin. PET on 12/29/18 showed soft tissue thickening at vaginal cuff concerning for recurrence with loss of fat plane at anterior wall rectum and posterior wall bladder. No metastatic disease.      1/18/2019:  Seen by me at [facility].  Exam notable for large mass at top of cuff.  Discussed referral to Rad Onc at [facility] for radiation therapy and MRI.     1/25/19:  MRI pelvis:  2.1 x 3.6 x 1.8 cm enhancing soft tissue mass in the right vaginal cuff with posterior extension into the anterior wall of the rectum. No evidence of bladder invasion, pelvic lymphadenopathy or metastatic disease.     1/30/19:  Seen by Rad  Onc with plans for EBRT, vaginal cuff brachytherapy via interstitial implant     19 to 3/20/19:  EBRT pelvis, vaginal cuff brachytherapy via interstitial implant     19:  PET scan: No hypermetabolic activity or irregular vaginal wall thickening to suggest residual disease.  Indeterminate 3mm solid pulmonary nodule, attention on follow-up.     20:  PET scan:  KEEGAN     6/3/2020:  CT CAP: KEEGAN    Patient seen today for follow-up.  She is doing well, no complaints, no bleeding.  Health has been good.           Past Medical History:    Past Medical History:   Diagnosis Date     Antiplatelet or antithrombotic long-term use     on Xarelto since  for unprovoked PE     Complication of anesthesia     PONV following hysterectomy     Diabetes mellitus type 2, insulin dependent (H)     on insulin & metformin     Diabetic neuropathy, type II diabetes mellitus (H)     hands & feet     Diastolic CHF, acute on chronic (H) 10/20/2012     Dyspnea on exertion      Endometrial cancer (H)      GERD (gastroesophageal reflux disease)     on omeprazole     Hx of pulmonary embolus      LVH (left ventricular hypertrophy) due to hypertensive disease 10/20/2012     Mixed hyperlipidemia      Obesity     BMI 55     Obstructive sleep apnea     has CPAP     Sacroiliitis (H) 2/15/2017     Substance abuse (H)      Thyroid nodule 2015     Uncomplicated asthma     Uses albuterol inhaler 1-2x/yr mostly during summer months         Past Surgical History:    Past Surgical History:   Procedure Laterality Date      SECTION      x2     DENTAL SURGERY      wisdom teeth extraction     HYSTERECTOMY TOTAL ABDOMINAL, BILATERAL SALPINGO-OOPHORECTOMY, COMBINED      cancer     INSERT INTERSTITIAL NEEDLE, ULTRASOUND GUIDED N/A 3/18/2019    Procedure: Exam Under Anesthesia, Insert Interstitial Needles With Ultrasound Guidance;  Surgeon: Elsy Elizabeth MD;  Location: [facility] OR     LAPAROSCOPY DIAGNOSTIC (GYN) N/A 3/18/2019     Procedure: Diagnostic Laparoscopy **Latex Allergy**;  Surgeon: Yohana Lau MD;  Location: [facility] OR     REMOVE INTERSTITIAL NEEDLE N/A 3/20/2019    Procedure: Removal Of Interstitial [place];  Surgeon: Elsy Elizabeth MD;  Location: [facility] OR     TUBAL LIGATION           Health Maintenance:  Health Maintenance Due   Topic Date Due     PREVENTIVE CARE VISIT  Never done     ALT  Never done     HF ACTION PLAN  Never done     LIPID  Never done     MICROALBUMIN  Never done     TSH W/FREE T4 REFLEX  Never done     DIABETIC FOOT EXAM  Never done     ADVANCE CARE PLANNING  Never done     DEPRESSION ACTION PLAN  Never done     EYE EXAM  Never done     MAMMO SCREENING  Never done     HIV SCREENING  Never done     HEPATITIS C SCREENING  Never done     ZOSTER IMMUNIZATION (1 of 2) Never done     A1C  09/07/2019     BMP  09/07/2019     PHQ-9  09/07/2019     CBC  03/18/2020     PAP  11/05/2021       Current Medications:     has a current medication list which includes the following prescription(s): accu-chek denys plus, acetaminophen, albuterol, albuterol, atorvastatin, blood glucose, cetirizine, docusate sodium, fluoxetine, fluticasone, gabapentin, gabapentin, insulin lispro protamine-insulin lispro, metformin, metformin hcl, omeprazole, and xarelto anticoagulant.       Allergies:     Asa [aspirin], Food, Latex, Lisinopril, and Prochlorperazine        Social History:     Social History     Tobacco Use     Smoking status: Never Smoker     Smokeless tobacco: Never Used   Substance Use Topics     Alcohol use: No     Alcohol/week: 0.0 standard drinks       History   Drug Use No           Family History:     The patient's family history is notable for :    Family History   Problem Relation Age of Onset     Cancer Mother         ovarian     Cancer Maternal Grandmother         Breast      Deep Vein Thrombosis Granddaughter         dvt following birth of baby     Cardiac Sudden Death No family hx of      Myocardial Infarction No  family hx of      Anesthesia Reaction No family hx of          Physical Exam:     There were no vitals taken for this visit.  There is no height or weight on file to calculate BMI.    General Appearance: healthy and alert, no distress     Musculoskeletal: extremities non tender and without edema    Skin: no lesions or rashes     Neurological: normal gait, no gross defects     Psychiatric: appropriate mood and affect                               Hematological: normal cervical, supraclavicular and inguinal lymph nodes     Gastrointestinal:       abdomen soft, non-tender, non-distended, no organomegaly or masses      Genitourinary: External genitalia and urethral meatus appears normal.  Vagina with post radiation changes.  Fixation at apex and anteriorly, mild scarring, no obvious signs of recurrence.     Assessment:    Lilli is a 60 y.o. old woman with a vaginal cuff/pelvic recurrence of her endometrial cancer      30 minutes spent on the date of the encounter doing chart review, history and exam, documentation and further activities as noted above        Plan:     1.) Vaginal cuff/pelvic recurrence of her endometrial cancer: Has completed radiation therapy, tolerated well.     No obvious signs of recurrence on exam today and currenly KEEGAN based on no new symptoms. Continue to monitor closely.  Repeat PET scan in January 2020 negative and plan had been for no further imaging unless indicated based on symptoms or exam findings.      Return in six months with NP      2.) Vaginal dilator: Continue dilator use.      3.)  Pulmonary nodule:  Stable on repeat imaging.      Crissy Fraga MD  Gynecologic Oncology  [facility]    CC  Patient Care Team:  José Le MD as PCP - General (Internal Medicine)  Crissy Fraga MD as Assigned Cancer Care Provider  SELF, REFERRED

## 2021-10-12 NOTE — LETTER
10/12/2021         RE: Lilli Locke  [address]  [place] [address]        Dear Colleague,    Thank you for referring your patient, Lilli Locke, to the [facility]. Please see a copy of my visit note below.                            Consult Notes on Referred Patient    Date: 10/12/2021         Patient returns today for follow-up in reference to her recurrent endometrial cancer        Her history is as follows:     She is status post diagnostic laparoscopy converted to exploratory laparotomy, LELE, BSO, BPLND, omentectomy, cysto on 8-. Final pathology showed a FIGO Grade 1 endometrioid SUE, 6.5cm greatest dimension, <50% myometrial invasion (0.5/3.2cm), no LVI, negative cytology, negative margins, negative nodes. Omentum, adnexa negative.      Patient's postoperative course was complicated by readmission with PE (prior history of PE, discharged on home rivaroxaban) and nausea and vomiting.      The patient had limited follow-up. Was seen in Gyn Onc clinic at [facility] in 9/2014 and most recently, in 4/2016, with recs for six month follow-up.    The patient sought care in 11/2017 for vaginal bleeding and was seen by a local Ob/Gyn. Exam was negative. However, since that time, she reportedly has been bleeding regularly. She had a CT 11/18 which was negative, US 11/18 also negative. She had a pap smear by her PCP showing AGC worrisome for malignancy. She was then seen by another Ob/Gyn at [facility] on 12/17/18. Exam notable for lesion at vaginal cuff which was biopsied. Path showed invasive SUE consistent with endometrial origin. PET on 12/29/18 showed soft tissue thickening at vaginal cuff concerning for recurrence with loss of fat plane at anterior wall rectum and posterior wall bladder. No metastatic disease.      1/18/2019:  Seen by me at [facility].  Exam notable for large mass at top of cuff.  Discussed referral to Rad Onc at [facility] for radiation therapy and  MRI.     19:  MRI pelvis:  2.1 x 3.6 x 1.8 cm enhancing soft tissue mass in the right vaginal cuff with posterior extension into the anterior wall of the rectum. No evidence of bladder invasion, pelvic lymphadenopathy or metastatic disease.     19:  Seen by Rad Onc with plans for EBRT, vaginal cuff brachytherapy via interstitial implant     19 to 3/20/19:  EBRT pelvis, vaginal cuff brachytherapy via interstitial implant     19:  PET scan: No hypermetabolic activity or irregular vaginal wall thickening to suggest residual disease.  Indeterminate 3mm solid pulmonary nodule, attention on follow-up.     20:  PET scan:  KEEGAN     6/3/2020:  CT CAP: KEEGAN    Patient seen today for follow-up.  She is doing well, no complaints, no bleeding.  Health has been good.           Past Medical History:    Past Medical History:   Diagnosis Date     Antiplatelet or antithrombotic long-term use     on Xarelto since  for unprovoked PE     Complication of anesthesia     PONV following hysterectomy     Diabetes mellitus type 2, insulin dependent (H)     on insulin & metformin     Diabetic neuropathy, type II diabetes mellitus (H)     hands & feet     Diastolic CHF, acute on chronic (H) 10/20/2012     Dyspnea on exertion      Endometrial cancer (H)      GERD (gastroesophageal reflux disease)     on omeprazole     Hx of pulmonary embolus      LVH (left ventricular hypertrophy) due to hypertensive disease 10/20/2012     Mixed hyperlipidemia      Obesity     BMI 55     Obstructive sleep apnea     has CPAP     Sacroiliitis (H) 2/15/2017     Substance abuse (H)      Thyroid nodule 2015     Uncomplicated asthma     Uses albuterol inhaler 1-2x/yr mostly during summer months         Past Surgical History:    Past Surgical History:   Procedure Laterality Date      SECTION      x2     DENTAL SURGERY      wisdom teeth extraction     HYSTERECTOMY TOTAL ABDOMINAL, BILATERAL SALPINGO-OOPHORECTOMY, COMBINED   2013    cancer     INSERT INTERSTITIAL NEEDLE, ULTRASOUND GUIDED N/A 3/18/2019    Procedure: Exam Under Anesthesia, Insert Interstitial Needles With Ultrasound Guidance;  Surgeon: Elsy Elizabeth MD;  Location: [facility]     LAPAROSCOPY DIAGNOSTIC (GYN) N/A 3/18/2019    Procedure: Diagnostic Laparoscopy **Latex Allergy**;  Surgeon: Yohana Lau MD;  Location: [facility]     REMOVE INTERSTITIAL NEEDLE N/A 3/20/2019    Procedure: Removal Of Interstitial [place];  Surgeon: Elsy Elizabeth MD;  Location: [facility] OR     TUBAL LIGATION           Health Maintenance:  Health Maintenance Due   Topic Date Due     PREVENTIVE CARE VISIT  Never done     ALT  Never done     HF ACTION PLAN  Never done     LIPID  Never done     MICROALBUMIN  Never done     TSH W/FREE T4 REFLEX  Never done     DIABETIC FOOT EXAM  Never done     ADVANCE CARE PLANNING  Never done     DEPRESSION ACTION PLAN  Never done     EYE EXAM  Never done     MAMMO SCREENING  Never done     HIV SCREENING  Never done     HEPATITIS C SCREENING  Never done     ZOSTER IMMUNIZATION (1 of 2) Never done     A1C  09/07/2019     BMP  09/07/2019     PHQ-9  09/07/2019     CBC  03/18/2020     PAP  11/05/2021       Current Medications:     has a current medication list which includes the following prescription(s): accu-chek denys plus, acetaminophen, albuterol, albuterol, atorvastatin, blood glucose, cetirizine, docusate sodium, fluoxetine, fluticasone, gabapentin, gabapentin, insulin lispro protamine-insulin lispro, metformin, metformin hcl, omeprazole, and xarelto anticoagulant.       Allergies:     Asa [aspirin], Food, Latex, Lisinopril, and Prochlorperazine        Social History:     Social History     Tobacco Use     Smoking status: Never Smoker     Smokeless tobacco: Never Used   Substance Use Topics     Alcohol use: No     Alcohol/week: 0.0 standard drinks       History   Drug Use No           Family History:     The patient's family history is notable for :    Family  History   Problem Relation Age of Onset     Cancer Mother         ovarian     Cancer Maternal Grandmother         Breast      Deep Vein Thrombosis Granddaughter         dvt following birth of baby     Cardiac Sudden Death No family hx of      Myocardial Infarction No family hx of      Anesthesia Reaction No family hx of          Physical Exam:     There were no vitals taken for this visit.  There is no height or weight on file to calculate BMI.    General Appearance: healthy and alert, no distress     Musculoskeletal: extremities non tender and without edema    Skin: no lesions or rashes     Neurological: normal gait, no gross defects     Psychiatric: appropriate mood and affect                               Hematological: normal cervical, supraclavicular and inguinal lymph nodes     Gastrointestinal:       abdomen soft, non-tender, non-distended, no organomegaly or masses      Genitourinary: External genitalia and urethral meatus appears normal.  Vagina with post radiation changes.  Fixation at apex and anteriorly, mild scarring, no obvious signs of recurrence.     Assessment:    Lilli is a 60 y.o. old woman with a vaginal cuff/pelvic recurrence of her endometrial cancer      30 minutes spent on the date of the encounter doing chart review, history and exam, documentation and further activities as noted above        Plan:     1.) Vaginal cuff/pelvic recurrence of her endometrial cancer: Has completed radiation therapy, tolerated well.     No obvious signs of recurrence on exam today and currenly KEEGAN based on no new symptoms. Continue to monitor closely.  Repeat PET scan in January 2020 negative and plan had been for no further imaging unless indicated based on symptoms or exam findings.      Return in six months with NP      2.) Vaginal dilator: Continue dilator use.      3.)  Pulmonary nodule:  Stable on repeat imaging.      Crissy Fraga MD  Gynecologic Oncology  [facility]    CC  Patient Care Team:  José Le MD as PCP - General (Internal Medicine)

## 2021-11-06 ENCOUNTER — HEALTH MAINTENANCE LETTER (OUTPATIENT)
Age: 61
End: 2021-11-06

## 2022-06-18 ENCOUNTER — HEALTH MAINTENANCE LETTER (OUTPATIENT)
Age: 62
End: 2022-06-18

## 2022-10-29 ENCOUNTER — HEALTH MAINTENANCE LETTER (OUTPATIENT)
Age: 62
End: 2022-10-29

## 2022-12-11 ENCOUNTER — HEALTH MAINTENANCE LETTER (OUTPATIENT)
Age: 62
End: 2022-12-11

## 2023-04-08 ENCOUNTER — HEALTH MAINTENANCE LETTER (OUTPATIENT)
Age: 63
End: 2023-04-08

## 2023-06-09 NOTE — PROGRESS NOTES
Consult Notes on Referred Patient    Date: 2019       Dr. Tawnya Whitfield  [facility]  [address]  [place] [address]       RE: Lilli Locke  : 1960  DAVID: 2019    Dear Dr. Tawnya Whitfield:    I had the pleasure of seeing your patient Lilli Locke here at the Gynecologic Cancer Clinic at the [facility] on 2019.  As you know she is a very pleasant 58 year old woman with a recent diagnosis of centrally recurrent endometrial cancer.  Given these findings she was subsequently sent to the Gynecologic Cancer Clinic for new patient consultation.     Her history is as follows:    She is status post diagnostic laparoscopy converted to exploratory laparotomy, LELE, BSO, BPLND, omentectomy, cysto on 2013. Final pathology showed a FIGO Grade 1 endometrioid SUE, 6.5cm greatest dimension, <50% myometrial invasion (0.5/3.2cm), no LVI, negative cytology, negative margins, negative nodes. Omentum, adnexa negative.      Patient's postoperative course was complicated by readmission with PE (prior history of PE, discharged on home rivaroxaban) and nausea and vomiting.      The patient had limited follow-up. Was seen in Gyn Onc clinic at [facility] in 2014 and most recently, in 2016, with recs for six month follow-up.    The patient sought care in 2017 for vaginal bleeding and was seen by a local Ob/Gyn. Exam was negative. However, since that time, she reportedly has been bleeding regularly. She had a CT  which was negative, US  also negative. She had a pap smear by her PCP showing AGC worrisome for malignancy. She was then seen by another Ob[place]Gyn [place] [facility] [place]. Exam notable for lesion at vaginal cuff which was biopsied. Path showed invasive SUE consistent with endometrial origin. PET on 18 showed soft tissue thickening at vaginal cuff concerning for recurrence with loss of fat plane at anterior wall rectum  and posterior wall bladder. No metastatic disease.     2019:  Seen by me at [facility].  Exam notable for large mass at top of cuff.  Discussed referral to Rad Onc at [facility] for radiation therapy and MRI.    19:  MRI pelvis:  2.1 x 3.6 x 1.8 cm enhancing soft tissue mass in the right vaginal cuff with posterior extension into the anterior wall of the rectum. No evidence of bladder invasion, pelvic lymphadenopathy or metastatic disease.    19:  Seen by Rad Onc with plans for EBRT, vaginal cuff brachytherapy via interstitial implant    The patient presents today for evaluation. She started radiation yesterday.  Some diarrhea yesterday also which has now resolved.  Pain controlled with Percocet. Ongoing intermittent vaginal bleeding.  No other complaints.       Past Medical History:    Past Medical History:   Diagnosis Date     Endometrial cancer (H)      Hx of pulmonary embolus      Mixed hyperlipidemia      Obstructive sleep apnea     has CPAP     Type II diabetes mellitus (H)          Past Surgical History:    Past Surgical History:   Procedure Laterality Date      SECTION      x2     TUBAL LIGATION           Health Maintenance:  Health Maintenance Due   Topic Date Due     PHQ-2 Q1 YR  1972     HIV SCREEN (SYSTEM ASSIGNED)  1978     HEPATITIS C SCREENING  1978     PAP SCREENING Q3 YR (SYSTEM ASSIGNED)  1981     DTAP/TDAP/TD IMMUNIZATION (1 - Tdap) 1985     MAMMO SCREEN Q2 YR (SYSTEM ASSIGNED)  2000     LIPID SCREEN Q5 YR FEMALE (SYSTEM ASSIGNED)  2005     COLON CANCER SCREEN (SYSTEM ASSIGNED)  2010     ZOSTER IMMUNIZATION (1 of 2) 2010     ADVANCE DIRECTIVE PLANNING Q5 YRS  2015     INFLUENZA VACCINE (1) 2018       Current Medications:     has a current medication list which includes the following prescription(s): albuterol, albuterol, atorvastatin, cetirizine, fluoxetine, fluticasone-salmeterol, gabapentin, insulin lispro prot &  lispro, lisinopril, metformin hcl, omeprazole, and oxycodone-acetaminophen.       Allergies:     [unfilled]        Social History:     Social History     Tobacco Use     Smoking status: Never Smoker     Smokeless tobacco: Never Used   Substance Use Topics     Alcohol use: No     Alcohol/week: 0.0 oz     Frequency: Never       History   Drug Use Not on file           Family History:     The patient's family history is notable for:    Family History   Problem Relation Age of Onset     Cancer Mother         ovarian     Cancer Maternal Grandmother         Breast          Physical Exam:     /68   Pulse 85   Temp 97.2  F (36.2  C) (Oral)   Wt 133.4 kg (294 lb)   SpO2 96%   BMI 53.77 kg/m    Body mass index is 53.77 kg/m .    General Appearance: healthy and alert, no distress     Musculoskeletal: extremities non tender and without edema    Skin: no lesions or rashes     Neurological: normal gait, no gross defects     Psychiatric: appropriate mood and affect                               Hematological: normal cervical, supraclavicular and inguinal lymph nodes     Gastrointestinal:       abdomen soft, non-tender, non-distended, no organomegaly or masses      Assessment:    Lilli is a 58 y.o. old woman with a vaginal cuff/pelvic recurrence of her endometrial cancer    A total of 45 minutes was spent with the patient, 45 minutes of which were spent in counseling the patient and/or treatment planning.    Plan:     1.) Vaginal cuff/pelvic recurrence of her endometrial cancer: Reviewed exam findings, PET, MRI and path report in detail with again with  patient. Unfortunately, she has a large mass at top of vagina which has likely been there for a while based on symptoms and size. Fortunately, no signs of metastatic disease. Plan radiation as per Radiation Oncology care.    Discussed interstitial needles, potential need for surgery. Questions answered.    Continue prn percocet.    Follow-up after  radiation.    Crissy Fraga MD  Gynecologic Oncology  [facility]        CC  Patient Care Team:  No Ref-Primary, Physician as PCP - General  CRIS HERNANDEZ     Home

## 2023-11-12 ENCOUNTER — HEALTH MAINTENANCE LETTER (OUTPATIENT)
Age: 63
End: 2023-11-12

## 2024-01-21 ENCOUNTER — HEALTH MAINTENANCE LETTER (OUTPATIENT)
Age: 64
End: 2024-01-21

## 2024-06-09 ENCOUNTER — HEALTH MAINTENANCE LETTER (OUTPATIENT)
Age: 64
End: 2024-06-09

## (undated) DEVICE — Device

## (undated) DEVICE — APPLICATOR COTTON TIP 6"X2 STERILE LF 6012

## (undated) DEVICE — SOL WATER IRRIG 1000ML BOTTLE 2F7114

## (undated) DEVICE — PITCHER STERILE 1000ML  SSK9004A

## (undated) DEVICE — LINEN TOWEL PACK X30 5481

## (undated) DEVICE — SOL NACL 0.9% IRRIG 1000ML BOTTLE 2F7124

## (undated) DEVICE — PAD CHUX UNDERPAD 23X24" 7136

## (undated) DEVICE — BASIN EMESIS STERILE  SSK9005A

## (undated) DEVICE — SU VICRYL 0 UR-6 27" J603H

## (undated) DEVICE — ESU ENDO SCISSORS 5MM CVD 5DCS

## (undated) DEVICE — ENDO TROCAR SLEEVE KII Z-THREADED 05X100MM CTS02

## (undated) DEVICE — LINEN TOWEL PACK X5 5464

## (undated) DEVICE — CATH FOLEY 24FR 5ML SILICONE LUBRI-SIL 175824

## (undated) DEVICE — SPONGE PACK VAGINAL 2"X9

## (undated) DEVICE — SOL NACL 0.9% INJ 1000ML BAG 2B1324X

## (undated) DEVICE — CATH PLUG W/CAP 000076

## (undated) DEVICE — GOWN XLG DISP 9545

## (undated) DEVICE — DRSG KERLIX 2 1/4"X3YDS ROLL 6720

## (undated) DEVICE — PREP CHLORAPREP 26ML TINTED ORANGE  260815

## (undated) DEVICE — DRSG ABDOMINAL 07 1/2X8" 7197D

## (undated) DEVICE — PREP TECHNI-CARE CHLOROXYLENOL 3% 4OZ BOTTLE C222-4ZWO

## (undated) DEVICE — DRSG KERLIX 4 1/2"X4YDS ROLL 6715

## (undated) DEVICE — STRAP UNIVERSAL POSITIONING 2-PIECE 4X47X76" 91-287

## (undated) DEVICE — ENDO TROCAR FIRST ENTRY KII FIOS Z-THRD 05X100MM CTF03

## (undated) DEVICE — ESU GROUND PAD ADULT W/CORD E7507

## (undated) DEVICE — SYR 10ML LL W/O NDL 302995

## (undated) DEVICE — LINEN TOWEL PACK X6 WHITE 5487

## (undated) DEVICE — PACK SET-UP STD 9102

## (undated) DEVICE — DRAPE LEGGINGS CLEAR 8430

## (undated) DEVICE — ENDO TROCAR FIRST ENTRY KII FIOS Z-THRD 12X100MM CTF73

## (undated) DEVICE — NDL COUNTER 20CT 31142493

## (undated) DEVICE — SU ETHIBOND 0 CT-2 30" X412H

## (undated) DEVICE — JELLY LUBRICATING SURGILUBE 2OZ TUBE

## (undated) DEVICE — SPONGE RAY-TEC 4X8" 7318

## (undated) DEVICE — GLOVE PROTEXIS POWDER FREE SMT 6.5  2D72PT65X

## (undated) DEVICE — DRSG ULCER ALGINATE COMFEEL PLUS 8X8" SQUARE 3120

## (undated) DEVICE — WIPES FOLEY CARE SURESTEP PROVON DFC100

## (undated) DEVICE — SU VICRYL 4-0 PS-2 18" UND J496H

## (undated) DEVICE — PREP SKIN SCRUB TRAY 4461A

## (undated) DEVICE — GLOVE PROTEXIS W/NEU-THERA 6.0  2D73TE60

## (undated) DEVICE — SUCTION IRR STRYKERFLOW II W/TIP 250-070-520

## (undated) DEVICE — SU DERMABOND ADVANCED .7ML DNX12

## (undated) DEVICE — SYR PISTON URETHRAL 60ML 68000

## (undated) DEVICE — CATH TRAY FOLEY SURESTEP 16FR W/URINE MTR STATLK LF A303416A

## (undated) DEVICE — GLOVE PROTEXIS BLUE W/NEU-THERA 7.0  2D73EB70

## (undated) DEVICE — SUCTION MANIFOLD DORNOCH ULTRA CART UL-CL500

## (undated) DEVICE — SOL NACL 0.9% INJ 1000ML BAG 07983-09

## (undated) RX ORDER — IPRATROPIUM BROMIDE AND ALBUTEROL SULFATE 2.5; .5 MG/3ML; MG/3ML
SOLUTION RESPIRATORY (INHALATION)
Status: DISPENSED
Start: 2019-03-18

## (undated) RX ORDER — LIDOCAINE HYDROCHLORIDE 20 MG/ML
INJECTION, SOLUTION EPIDURAL; INFILTRATION; INTRACAUDAL; PERINEURAL
Status: DISPENSED
Start: 2019-03-20

## (undated) RX ORDER — GLYCOPYRROLATE 0.2 MG/ML
INJECTION, SOLUTION INTRAMUSCULAR; INTRAVENOUS
Status: DISPENSED
Start: 2019-03-18

## (undated) RX ORDER — PROPOFOL 10 MG/ML
INJECTION, EMULSION INTRAVENOUS
Status: DISPENSED
Start: 2019-03-20

## (undated) RX ORDER — FENTANYL CITRATE 50 UG/ML
INJECTION, SOLUTION INTRAMUSCULAR; INTRAVENOUS
Status: DISPENSED
Start: 2019-03-18

## (undated) RX ORDER — HYDROMORPHONE HYDROCHLORIDE 1 MG/ML
INJECTION, SOLUTION INTRAMUSCULAR; INTRAVENOUS; SUBCUTANEOUS
Status: DISPENSED
Start: 2019-03-18

## (undated) RX ORDER — ONDANSETRON 2 MG/ML
INJECTION INTRAMUSCULAR; INTRAVENOUS
Status: DISPENSED
Start: 2019-03-20

## (undated) RX ORDER — PHENYLEPHRINE HCL IN 0.9% NACL 1 MG/10 ML
SYRINGE (ML) INTRAVENOUS
Status: DISPENSED
Start: 2019-03-18

## (undated) RX ORDER — ONDANSETRON 2 MG/ML
INJECTION INTRAMUSCULAR; INTRAVENOUS
Status: DISPENSED
Start: 2019-03-18

## (undated) RX ORDER — EPHEDRINE SULFATE 50 MG/ML
INJECTION, SOLUTION INTRAMUSCULAR; INTRAVENOUS; SUBCUTANEOUS
Status: DISPENSED
Start: 2019-03-18

## (undated) RX ORDER — LIDOCAINE HYDROCHLORIDE 20 MG/ML
INJECTION, SOLUTION EPIDURAL; INFILTRATION; INTRACAUDAL; PERINEURAL
Status: DISPENSED
Start: 2019-03-18

## (undated) RX ORDER — FENTANYL CITRATE 50 UG/ML
INJECTION, SOLUTION INTRAMUSCULAR; INTRAVENOUS
Status: DISPENSED
Start: 2019-03-20

## (undated) RX ORDER — DEXAMETHASONE SODIUM PHOSPHATE 4 MG/ML
INJECTION, SOLUTION INTRA-ARTICULAR; INTRALESIONAL; INTRAMUSCULAR; INTRAVENOUS; SOFT TISSUE
Status: DISPENSED
Start: 2019-03-18

## (undated) RX ORDER — PHENAZOPYRIDINE HYDROCHLORIDE 200 MG/1
TABLET, FILM COATED ORAL
Status: DISPENSED
Start: 2019-03-18